# Patient Record
Sex: MALE | Race: WHITE | NOT HISPANIC OR LATINO | Employment: FULL TIME | URBAN - NONMETROPOLITAN AREA
[De-identification: names, ages, dates, MRNs, and addresses within clinical notes are randomized per-mention and may not be internally consistent; named-entity substitution may affect disease eponyms.]

---

## 2018-11-06 ENCOUNTER — HOSPITAL ENCOUNTER (EMERGENCY)
Facility: HOSPITAL | Age: 33
Discharge: HOME | End: 2018-11-06
Attending: FAMILY MEDICINE | Admitting: FAMILY MEDICINE

## 2018-11-06 VITALS
RESPIRATION RATE: 18 BRPM | BODY MASS INDEX: 24.21 KG/M2 | TEMPERATURE: 98.4 F | WEIGHT: 205 LBS | DIASTOLIC BLOOD PRESSURE: 75 MMHG | HEART RATE: 96 BPM | SYSTOLIC BLOOD PRESSURE: 133 MMHG | OXYGEN SATURATION: 97 % | HEIGHT: 77 IN

## 2018-11-06 DIAGNOSIS — L02.91 ABSCESS: Primary | ICD-10-CM

## 2018-11-06 LAB
ALBUMIN SERPL-MCNC: 3.9 G/DL (ref 3.4–4.8)
ALBUMIN/GLOB SERPL: 1.3 G/DL (ref 1.1–1.8)
ALP SERPL-CCNC: 132 U/L (ref 38–126)
ALT SERPL W P-5'-P-CCNC: 139 U/L (ref 21–72)
ANION GAP SERPL CALCULATED.3IONS-SCNC: 9 MMOL/L (ref 5–15)
AST SERPL-CCNC: 82 U/L (ref 17–59)
BASOPHILS # BLD AUTO: 0.02 10*3/MM3 (ref 0–0.2)
BASOPHILS NFR BLD AUTO: 0.2 % (ref 0–2)
BILIRUB SERPL-MCNC: 1.1 MG/DL (ref 0.2–1.3)
BUN BLD-MCNC: 14 MG/DL (ref 7–21)
BUN/CREAT SERPL: 16.3 (ref 7–25)
CALCIUM SPEC-SCNC: 8.7 MG/DL (ref 8.4–10.2)
CHLORIDE SERPL-SCNC: 102 MMOL/L (ref 95–110)
CO2 SERPL-SCNC: 25 MMOL/L (ref 22–31)
CREAT BLD-MCNC: 0.86 MG/DL (ref 0.7–1.3)
D-LACTATE SERPL-SCNC: 0.7 MMOL/L (ref 0.5–2)
DEPRECATED RDW RBC AUTO: 40.7 FL (ref 35.1–43.9)
EOSINOPHIL # BLD AUTO: 0.13 10*3/MM3 (ref 0–0.7)
EOSINOPHIL NFR BLD AUTO: 1.1 % (ref 0–7)
ERYTHROCYTE [DISTWIDTH] IN BLOOD BY AUTOMATED COUNT: 12.8 % (ref 11.5–14.5)
GFR SERPL CREATININE-BSD FRML MDRD: 102 ML/MIN/1.73 (ref 70–162)
GLOBULIN UR ELPH-MCNC: 2.9 GM/DL (ref 2.3–3.5)
GLUCOSE BLD-MCNC: 107 MG/DL (ref 60–100)
HCT VFR BLD AUTO: 36.9 % (ref 39–49)
HGB BLD-MCNC: 13.3 G/DL (ref 13.7–17.3)
HOLD SPECIMEN: NORMAL
IMM GRANULOCYTES # BLD: 0.03 10*3/MM3 (ref 0–0.02)
IMM GRANULOCYTES NFR BLD: 0.3 % (ref 0–0.5)
LYMPHOCYTES # BLD AUTO: 1.28 10*3/MM3 (ref 0.6–4.2)
LYMPHOCYTES NFR BLD AUTO: 10.9 % (ref 10–50)
MCH RBC QN AUTO: 31.1 PG (ref 26.5–34)
MCHC RBC AUTO-ENTMCNC: 36 G/DL (ref 31.5–36.3)
MCV RBC AUTO: 86.2 FL (ref 80–98)
MONOCYTES # BLD AUTO: 1.23 10*3/MM3 (ref 0–0.9)
MONOCYTES NFR BLD AUTO: 10.5 % (ref 0–12)
NEUTROPHILS # BLD AUTO: 9.07 10*3/MM3 (ref 2–8.6)
NEUTROPHILS NFR BLD AUTO: 77 % (ref 37–80)
PLATELET # BLD AUTO: 179 10*3/MM3 (ref 150–450)
PMV BLD AUTO: 10.9 FL (ref 8–12)
POTASSIUM BLD-SCNC: 3.3 MMOL/L (ref 3.5–5.1)
PROT SERPL-MCNC: 6.8 G/DL (ref 6.3–8.6)
RBC # BLD AUTO: 4.28 10*6/MM3 (ref 4.37–5.74)
SODIUM BLD-SCNC: 136 MMOL/L (ref 137–145)
WBC NRBC COR # BLD: 11.76 10*3/MM3 (ref 3.2–9.8)

## 2018-11-06 PROCEDURE — 96375 TX/PRO/DX INJ NEW DRUG ADDON: CPT

## 2018-11-06 PROCEDURE — 80074 ACUTE HEPATITIS PANEL: CPT | Performed by: FAMILY MEDICINE

## 2018-11-06 PROCEDURE — 25010000002 VANCOMYCIN: Performed by: FAMILY MEDICINE

## 2018-11-06 PROCEDURE — 96365 THER/PROPH/DIAG IV INF INIT: CPT

## 2018-11-06 PROCEDURE — 80053 COMPREHEN METABOLIC PANEL: CPT | Performed by: FAMILY MEDICINE

## 2018-11-06 PROCEDURE — 85025 COMPLETE CBC W/AUTO DIFF WBC: CPT | Performed by: FAMILY MEDICINE

## 2018-11-06 PROCEDURE — 25010000002 KETOROLAC TROMETHAMINE PER 15 MG

## 2018-11-06 PROCEDURE — 87205 SMEAR GRAM STAIN: CPT | Performed by: FAMILY MEDICINE

## 2018-11-06 PROCEDURE — 99283 EMERGENCY DEPT VISIT LOW MDM: CPT

## 2018-11-06 PROCEDURE — 83605 ASSAY OF LACTIC ACID: CPT | Performed by: FAMILY MEDICINE

## 2018-11-06 PROCEDURE — 87147 CULTURE TYPE IMMUNOLOGIC: CPT | Performed by: FAMILY MEDICINE

## 2018-11-06 PROCEDURE — 87077 CULTURE AEROBIC IDENTIFY: CPT | Performed by: FAMILY MEDICINE

## 2018-11-06 PROCEDURE — 87186 SC STD MICRODIL/AGAR DIL: CPT | Performed by: FAMILY MEDICINE

## 2018-11-06 PROCEDURE — 87070 CULTURE OTHR SPECIMN AEROBIC: CPT | Performed by: FAMILY MEDICINE

## 2018-11-06 PROCEDURE — 87040 BLOOD CULTURE FOR BACTERIA: CPT | Performed by: FAMILY MEDICINE

## 2018-11-06 RX ORDER — KETOROLAC TROMETHAMINE 30 MG/ML
30 INJECTION, SOLUTION INTRAMUSCULAR; INTRAVENOUS EVERY 6 HOURS PRN
Status: DISCONTINUED | OUTPATIENT
Start: 2018-11-06 | End: 2018-11-06 | Stop reason: HOSPADM

## 2018-11-06 RX ORDER — LIDOCAINE HYDROCHLORIDE 10 MG/ML
INJECTION, SOLUTION EPIDURAL; INFILTRATION; INTRACAUDAL; PERINEURAL
Status: COMPLETED
Start: 2018-11-06 | End: 2018-11-06

## 2018-11-06 RX ORDER — KETOROLAC TROMETHAMINE 30 MG/ML
INJECTION, SOLUTION INTRAMUSCULAR; INTRAVENOUS
Status: COMPLETED
Start: 2018-11-06 | End: 2018-11-06

## 2018-11-06 RX ORDER — LIDOCAINE HYDROCHLORIDE 10 MG/ML
10 INJECTION, SOLUTION EPIDURAL; INFILTRATION; INTRACAUDAL; PERINEURAL ONCE
Status: COMPLETED | OUTPATIENT
Start: 2018-11-06 | End: 2018-11-06

## 2018-11-06 RX ORDER — KETOROLAC TROMETHAMINE 30 MG/ML
60 INJECTION, SOLUTION INTRAMUSCULAR; INTRAVENOUS ONCE
Status: DISCONTINUED | OUTPATIENT
Start: 2018-11-06 | End: 2018-11-06

## 2018-11-06 RX ORDER — SULFAMETHOXAZOLE AND TRIMETHOPRIM 800; 160 MG/1; MG/1
1 TABLET ORAL 2 TIMES DAILY
Qty: 14 TABLET | Refills: 0 | Status: SHIPPED | OUTPATIENT
Start: 2018-11-06 | End: 2018-11-07

## 2018-11-06 RX ADMIN — KETOROLAC TROMETHAMINE 30 MG: 30 INJECTION, SOLUTION INTRAMUSCULAR at 02:42

## 2018-11-06 RX ADMIN — LIDOCAINE HYDROCHLORIDE 10 ML: 10 INJECTION, SOLUTION EPIDURAL; INFILTRATION; INTRACAUDAL; PERINEURAL at 03:55

## 2018-11-06 RX ADMIN — LIDOCAINE HYDROCHLORIDE 10 ML: 10 INJECTION, SOLUTION EPIDURAL; INFILTRATION; INTRACAUDAL at 03:55

## 2018-11-06 RX ADMIN — VANCOMYCIN HYDROCHLORIDE 1750 MG: 1 INJECTION, POWDER, LYOPHILIZED, FOR SOLUTION INTRAVENOUS at 04:34

## 2018-11-06 RX ADMIN — KETOROLAC TROMETHAMINE 30 MG: 30 INJECTION, SOLUTION INTRAMUSCULAR; INTRAVENOUS at 02:42

## 2018-11-06 NOTE — ED PROVIDER NOTES
Subjective   33 white male presents to emergency department with complaint of a skin lesion on his right forearm.  He states that he first noticed a small pimple on his arm approximate 4 days ago.  It got progressively worse and red.  He was seen in the urgent care this morning and given an injection of Rocephin and started on an oral antibiotic once a day, apparently, Levaquin.  He was told that if he got presently worse he was to return to care or go to the emergency room.  His pain currently as 6 out of 10.        Wound Infection   Severity:  Moderate  Onset quality:  Gradual  Duration:  4 days  Timing:  Constant  Progression:  Worsening  Chronicity:  New  Associated symptoms: fever    Associated symptoms: no abdominal pain, no chest pain, no cough, no diarrhea, no fatigue, no loss of consciousness, no myalgias, no nausea, no rash, no rhinorrhea, no shortness of breath, no sore throat, no vomiting and no wheezing        Review of Systems   Constitutional: Positive for fever. Negative for activity change, appetite change, chills, fatigue and unexpected weight change.   HENT: Negative for nosebleeds, rhinorrhea, sore throat, trouble swallowing and voice change.    Eyes: Negative for photophobia, pain and visual disturbance.   Respiratory: Negative for apnea, cough, chest tightness, shortness of breath, wheezing and stridor.    Cardiovascular: Negative for chest pain, palpitations and leg swelling.   Gastrointestinal: Negative for abdominal distention, abdominal pain, blood in stool, constipation, diarrhea, nausea and vomiting.   Endocrine: Negative for cold intolerance, heat intolerance, polydipsia and polyuria.   Genitourinary: Negative for decreased urine volume, difficulty urinating, dysuria, flank pain, hematuria and urgency.   Musculoskeletal: Negative for arthralgias, myalgias, neck pain and neck stiffness.   Skin: Negative for color change, pallor and rash.   Allergic/Immunologic: Negative for  immunocompromised state.   Neurological: Negative for dizziness, seizures, loss of consciousness, syncope, weakness, light-headedness and numbness.   Hematological: Negative for adenopathy.   Psychiatric/Behavioral: Negative for agitation, confusion, dysphoric mood and suicidal ideas. The patient is not nervous/anxious.        History reviewed. No pertinent past medical history.    No Known Allergies    History reviewed. No pertinent surgical history.    No family history on file.    Social History     Social History   • Marital status: Single     Social History Main Topics   • Drug use: Unknown     Other Topics Concern   • Not on file           Objective   Physical Exam   Constitutional: He is oriented to person, place, and time. He appears well-developed and well-nourished. No distress.   HENT:   Head: Normocephalic and atraumatic.   Right Ear: External ear normal.   Left Ear: External ear normal.   Mouth/Throat: Oropharynx is clear and moist.   Eyes: Pupils are equal, round, and reactive to light. Conjunctivae and EOM are normal. No scleral icterus.   Neck: Normal range of motion. Neck supple. No JVD present. No tracheal deviation present. No thyromegaly present.   Cardiovascular: Normal rate and regular rhythm.  Exam reveals no gallop and no friction rub.    No murmur heard.  Pulmonary/Chest: Effort normal and breath sounds normal. No stridor. No respiratory distress. He has no wheezes. He has no rales. He exhibits no tenderness.   Abdominal: Soft. Bowel sounds are normal. He exhibits no distension and no mass. There is no tenderness. There is no rebound and no guarding.   Musculoskeletal: He exhibits no edema, tenderness or deformity.   Lymphadenopathy:     He has no cervical adenopathy.   Neurological: He is alert and oriented to person, place, and time. No cranial nerve deficit. He exhibits normal muscle tone. Coordination normal.   Skin: Skin is warm and dry. He is not diaphoretic. There is erythema.         There is a large area of cellulitis on the posterior aspect of the right forearm.  There is edema and erythema on the medial aspect with lymphangitis tracking anteriorly up the bicep.   Psychiatric: He has a normal mood and affect.       Incision & Drainage  Date/Time: 11/6/2018 4:11 AM  Performed by: GABY ARMENDARIZ  Authorized by: GABY ARMENDARIZ     Consent:     Consent obtained:  Verbal    Consent given by:  Patient    Risks discussed:  Bleeding, incomplete drainage, infection and pain    Alternatives discussed:  No treatment, alternative treatment and observation  Location:     Type:  Abscess    Location:  Upper extremity    Upper extremity location:  Elbow    Elbow location:  R elbow  Pre-procedure details:     Skin preparation:  Chloraprep  Anesthesia (see MAR for exact dosages):     Anesthesia method:  Local infiltration    Local anesthetic:  Lidocaine 1% w/o epi  Procedure type:     Complexity:  Complex  Procedure details:     Needle aspiration: no      Incision types:  Single straight    Incision depth:  Subcutaneous    Scalpel blade:  11    Wound management:  Probed and deloculated    Drainage:  Purulent    Drainage amount:  Moderate    Wound treatment:  Wound left open    Packing materials:  1/4 in iodoform gauze  Post-procedure details:     Patient tolerance of procedure:  Tolerated well, no immediate complications               ED Course  ED Course as of Nov 06 0616   Tue Nov 06, 2018   0412 Patient was placed in room 10 and evaluated by me.  He was febrile with a temperature of 100.9.  His white count was 12,000.  Lactate was 0.7.  Blood cultures were obtained.  I&D was performed and expressed a moderate amount of purulent material.  The arm looked quite a bit better after the procedure.  The wound was cleaned and dressed by the nurse, Anastacia.  IV vancomycin was given in the emergency room.  At this point obviously he is stable for discharge and will follow up tomorrow for wound  repacking.  He will be discharged on an oral antibiotic.  [CE]      ED Course User Index  [CE] Rajiv Gooden DO        Labs Reviewed   COMPREHENSIVE METABOLIC PANEL - Abnormal; Notable for the following:        Result Value    Glucose 107 (*)     Sodium 136 (*)     Potassium 3.3 (*)     ALT (SGPT) 139 (*)     AST (SGOT) 82 (*)     Alkaline Phosphatase 132 (*)     All other components within normal limits   CBC WITH AUTO DIFFERENTIAL - Abnormal; Notable for the following:     WBC 11.76 (*)     RBC 4.28 (*)     Hemoglobin 13.3 (*)     Hematocrit 36.9 (*)     Neutrophils, Absolute 9.07 (*)     Monocytes, Absolute 1.23 (*)     Immature Grans, Absolute 0.03 (*)     All other components within normal limits   LACTIC ACID, PLASMA - Normal   WOUND CULTURE   BLOOD CULTURE   BLOOD CULTURE   CBC AND DIFFERENTIAL    Narrative:     The following orders were created for panel order CBC & Differential.  Procedure                               Abnormality         Status                     ---------                               -----------         ------                     CBC Auto Differential[[id number]]        Abnormal            Final result                 Please view results for these tests on the individual orders.   EXTRA TUBES    Narrative:     The following orders were created for panel order Extra Tubes.  Procedure                               Abnormality         Status                     ---------                               -----------         ------                     Gold Top - SST[[id number]]                                   Final result                 Please view results for these tests on the individual orders.   GOLD TOP - SST     No results found.          [facility]      Final diagnoses:   Abscess            Rajiv Gooden DO  11/06/18 0617

## 2018-11-07 ENCOUNTER — APPOINTMENT (OUTPATIENT)
Dept: GENERAL RADIOLOGY | Facility: HOSPITAL | Age: 33
End: 2018-11-07

## 2018-11-07 ENCOUNTER — HOSPITAL ENCOUNTER (INPATIENT)
Facility: HOSPITAL | Age: 33
LOS: 3 days | Discharge: HOME | End: 2018-11-10
Attending: EMERGENCY MEDICINE | Admitting: FAMILY MEDICINE

## 2018-11-07 ENCOUNTER — APPOINTMENT (OUTPATIENT)
Dept: INTERVENTIONAL RADIOLOGY/VASCULAR | Facility: HOSPITAL | Age: 33
End: 2018-11-07

## 2018-11-07 ENCOUNTER — APPOINTMENT (OUTPATIENT)
Dept: ULTRASOUND IMAGING | Facility: HOSPITAL | Age: 33
End: 2018-11-07

## 2018-11-07 ENCOUNTER — APPOINTMENT (OUTPATIENT)
Dept: CT IMAGING | Facility: HOSPITAL | Age: 33
End: 2018-11-07

## 2018-11-07 DIAGNOSIS — L03.113 CELLULITIS OF RIGHT UPPER EXTREMITY: Primary | ICD-10-CM

## 2018-11-07 LAB
ALBUMIN SERPL-MCNC: 3.7 G/DL (ref 3.4–4.8)
ALBUMIN/GLOB SERPL: 1.2 G/DL (ref 1.1–1.8)
ALP SERPL-CCNC: 147 U/L (ref 38–126)
ALT SERPL W P-5'-P-CCNC: 130 U/L (ref 21–72)
AMPHET+METHAMPHET UR QL: NEGATIVE
ANION GAP SERPL CALCULATED.3IONS-SCNC: 6 MMOL/L (ref 5–15)
AST SERPL-CCNC: 61 U/L (ref 17–59)
BACTERIA UR QL AUTO: ABNORMAL /HPF
BARBITURATES UR QL SCN: NEGATIVE
BASOPHILS # BLD AUTO: 0.03 10*3/MM3 (ref 0–0.2)
BASOPHILS NFR BLD AUTO: 0.3 % (ref 0–2)
BENZODIAZ UR QL SCN: NEGATIVE
BILIRUB SERPL-MCNC: 1 MG/DL (ref 0.2–1.3)
BILIRUB UR QL STRIP: ABNORMAL
BUN BLD-MCNC: 12 MG/DL (ref 7–21)
BUN/CREAT SERPL: 12.5 (ref 7–25)
CALCIUM SPEC-SCNC: 8.8 MG/DL (ref 8.4–10.2)
CANNABINOIDS SERPL QL: NEGATIVE
CHLORIDE SERPL-SCNC: 102 MMOL/L (ref 95–110)
CLARITY UR: CLEAR
CO2 SERPL-SCNC: 27 MMOL/L (ref 22–31)
COCAINE UR QL: NEGATIVE
COLOR UR: ABNORMAL
CREAT BLD-MCNC: 0.96 MG/DL (ref 0.7–1.3)
CRP SERPL-MCNC: 29.3 MG/DL (ref 0–1)
DEPRECATED RDW RBC AUTO: 40.5 FL (ref 35.1–43.9)
EOSINOPHIL # BLD AUTO: 0.27 10*3/MM3 (ref 0–0.7)
EOSINOPHIL NFR BLD AUTO: 2.7 % (ref 0–7)
ERYTHROCYTE [DISTWIDTH] IN BLOOD BY AUTOMATED COUNT: 12.6 % (ref 11.5–14.5)
ERYTHROCYTE [SEDIMENTATION RATE] IN BLOOD: 50 MM/HR (ref 0–15)
GFR SERPL CREATININE-BSD FRML MDRD: 90 ML/MIN/1.73 (ref 70–162)
GLOBULIN UR ELPH-MCNC: 3.2 GM/DL (ref 2.3–3.5)
GLUCOSE BLD-MCNC: 95 MG/DL (ref 60–100)
GLUCOSE UR STRIP-MCNC: NEGATIVE MG/DL
HCT VFR BLD AUTO: 38.5 % (ref 39–49)
HGB BLD-MCNC: 13.7 G/DL (ref 13.7–17.3)
HGB UR QL STRIP.AUTO: NEGATIVE
HOLD SPECIMEN: NORMAL
HYALINE CASTS UR QL AUTO: ABNORMAL /LPF
IMM GRANULOCYTES # BLD: 0.04 10*3/MM3 (ref 0–0.02)
IMM GRANULOCYTES NFR BLD: 0.4 % (ref 0–0.5)
KETONES UR QL STRIP: ABNORMAL
LEUKOCYTE ESTERASE UR QL STRIP.AUTO: ABNORMAL
LYMPHOCYTES # BLD AUTO: 1.38 10*3/MM3 (ref 0.6–4.2)
LYMPHOCYTES NFR BLD AUTO: 13.9 % (ref 10–50)
MCH RBC QN AUTO: 30.8 PG (ref 26.5–34)
MCHC RBC AUTO-ENTMCNC: 35.6 G/DL (ref 31.5–36.3)
MCV RBC AUTO: 86.5 FL (ref 80–98)
METHADONE UR QL SCN: NEGATIVE
MONOCYTES # BLD AUTO: 1.09 10*3/MM3 (ref 0–0.9)
MONOCYTES NFR BLD AUTO: 11 % (ref 0–12)
MUCOUS THREADS URNS QL MICRO: ABNORMAL /HPF
NEUTROPHILS # BLD AUTO: 7.09 10*3/MM3 (ref 2–8.6)
NEUTROPHILS NFR BLD AUTO: 71.7 % (ref 37–80)
NITRITE UR QL STRIP: NEGATIVE
OPIATES UR QL: POSITIVE
OXYCODONE UR QL SCN: NEGATIVE
PH UR STRIP.AUTO: 7.5 [PH] (ref 5–9)
PLATELET # BLD AUTO: 180 10*3/MM3 (ref 150–450)
PMV BLD AUTO: 10.8 FL (ref 8–12)
POTASSIUM BLD-SCNC: 3.6 MMOL/L (ref 3.5–5.1)
PROT SERPL-MCNC: 6.9 G/DL (ref 6.3–8.6)
PROT UR QL STRIP: ABNORMAL
RBC # BLD AUTO: 4.45 10*6/MM3 (ref 4.37–5.74)
RBC # UR: ABNORMAL /HPF
REF LAB TEST METHOD: ABNORMAL
SODIUM BLD-SCNC: 135 MMOL/L (ref 137–145)
SP GR UR STRIP: 1.03 (ref 1–1.03)
SQUAMOUS #/AREA URNS HPF: ABNORMAL /HPF
UROBILINOGEN UR QL STRIP: ABNORMAL
WBC NRBC COR # BLD: 9.9 10*3/MM3 (ref 3.2–9.8)
WBC UR QL AUTO: ABNORMAL /HPF
WHOLE BLOOD HOLD SPECIMEN: NORMAL

## 2018-11-07 PROCEDURE — 25010000002 PIPERACILLIN SOD-TAZOBACTAM PER 1 G: Performed by: EMERGENCY MEDICINE

## 2018-11-07 PROCEDURE — C1751 CATH, INF, PER/CENT/MIDLINE: HCPCS

## 2018-11-07 PROCEDURE — 76937 US GUIDE VASCULAR ACCESS: CPT

## 2018-11-07 PROCEDURE — 87077 CULTURE AEROBIC IDENTIFY: CPT | Performed by: PHYSICIAN ASSISTANT

## 2018-11-07 PROCEDURE — 25010000002 IOPAMIDOL 61 % SOLUTION: Performed by: ORTHOPAEDIC SURGERY

## 2018-11-07 PROCEDURE — 73090 X-RAY EXAM OF FOREARM: CPT

## 2018-11-07 PROCEDURE — 81001 URINALYSIS AUTO W/SCOPE: CPT | Performed by: EMERGENCY MEDICINE

## 2018-11-07 PROCEDURE — 02HV33Z INSERTION OF INFUSION DEVICE INTO SUPERIOR VENA CAVA, PERCUTANEOUS APPROACH: ICD-10-PCS | Performed by: FAMILY MEDICINE

## 2018-11-07 PROCEDURE — 80307 DRUG TEST PRSMV CHEM ANLYZR: CPT | Performed by: EMERGENCY MEDICINE

## 2018-11-07 PROCEDURE — 85651 RBC SED RATE NONAUTOMATED: CPT | Performed by: EMERGENCY MEDICINE

## 2018-11-07 PROCEDURE — B548ZZA ULTRASONOGRAPHY OF SUPERIOR VENA CAVA, GUIDANCE: ICD-10-PCS | Performed by: FAMILY MEDICINE

## 2018-11-07 PROCEDURE — 99232 SBSQ HOSP IP/OBS MODERATE 35: CPT | Performed by: ORTHOPAEDIC SURGERY

## 2018-11-07 PROCEDURE — 86140 C-REACTIVE PROTEIN: CPT | Performed by: EMERGENCY MEDICINE

## 2018-11-07 PROCEDURE — 87070 CULTURE OTHR SPECIMN AEROBIC: CPT | Performed by: PHYSICIAN ASSISTANT

## 2018-11-07 PROCEDURE — 87205 SMEAR GRAM STAIN: CPT | Performed by: PHYSICIAN ASSISTANT

## 2018-11-07 PROCEDURE — 73060 X-RAY EXAM OF HUMERUS: CPT

## 2018-11-07 PROCEDURE — 85025 COMPLETE CBC W/AUTO DIFF WBC: CPT | Performed by: EMERGENCY MEDICINE

## 2018-11-07 PROCEDURE — 93971 EXTREMITY STUDY: CPT

## 2018-11-07 PROCEDURE — 73201 CT UPPER EXTREMITY W/DYE: CPT

## 2018-11-07 PROCEDURE — 25010000002 CEFEPIME PER 500 MG: Performed by: FAMILY MEDICINE

## 2018-11-07 PROCEDURE — 25010000002 VANCOMYCIN 5 G RECONSTITUTED SOLUTION: Performed by: EMERGENCY MEDICINE

## 2018-11-07 PROCEDURE — 80053 COMPREHEN METABOLIC PANEL: CPT | Performed by: EMERGENCY MEDICINE

## 2018-11-07 PROCEDURE — 87147 CULTURE TYPE IMMUNOLOGIC: CPT | Performed by: PHYSICIAN ASSISTANT

## 2018-11-07 PROCEDURE — 99284 EMERGENCY DEPT VISIT MOD MDM: CPT

## 2018-11-07 RX ORDER — VENLAFAXINE 75 MG/1
75 TABLET ORAL DAILY
COMMUNITY
End: 2019-05-23

## 2018-11-07 RX ORDER — MORPHINE SULFATE 2 MG/ML
2 INJECTION, SOLUTION INTRAMUSCULAR; INTRAVENOUS
Status: DISCONTINUED | OUTPATIENT
Start: 2018-11-07 | End: 2018-11-10 | Stop reason: HOSPADM

## 2018-11-07 RX ORDER — ACETAMINOPHEN 325 MG/1
650 TABLET ORAL EVERY 4 HOURS PRN
Status: DISCONTINUED | OUTPATIENT
Start: 2018-11-07 | End: 2018-11-10 | Stop reason: HOSPADM

## 2018-11-07 RX ORDER — SODIUM CHLORIDE 0.9 % (FLUSH) 0.9 %
3-10 SYRINGE (ML) INJECTION AS NEEDED
Status: DISCONTINUED | OUTPATIENT
Start: 2018-11-07 | End: 2018-11-10 | Stop reason: HOSPADM

## 2018-11-07 RX ORDER — HYDROCODONE BITARTRATE AND ACETAMINOPHEN 10; 325 MG/1; MG/1
1 TABLET ORAL EVERY 6 HOURS PRN
COMMUNITY

## 2018-11-07 RX ORDER — IPRATROPIUM BROMIDE AND ALBUTEROL SULFATE 2.5; .5 MG/3ML; MG/3ML
3 SOLUTION RESPIRATORY (INHALATION) EVERY 4 HOURS PRN
Status: DISCONTINUED | OUTPATIENT
Start: 2018-11-07 | End: 2018-11-10 | Stop reason: HOSPADM

## 2018-11-07 RX ORDER — KETOROLAC TROMETHAMINE 30 MG/ML
30 INJECTION, SOLUTION INTRAMUSCULAR; INTRAVENOUS EVERY 6 HOURS PRN
Status: DISCONTINUED | OUTPATIENT
Start: 2018-11-07 | End: 2018-11-10 | Stop reason: HOSPADM

## 2018-11-07 RX ORDER — SODIUM CHLORIDE 0.9 % (FLUSH) 0.9 %
10 SYRINGE (ML) INJECTION AS NEEDED
Status: DISCONTINUED | OUTPATIENT
Start: 2018-11-07 | End: 2018-11-10 | Stop reason: HOSPADM

## 2018-11-07 RX ORDER — SODIUM CHLORIDE 0.9 % (FLUSH) 0.9 %
3 SYRINGE (ML) INJECTION EVERY 12 HOURS SCHEDULED
Status: DISCONTINUED | OUTPATIENT
Start: 2018-11-07 | End: 2018-11-10 | Stop reason: HOSPADM

## 2018-11-07 RX ORDER — HYDROCODONE BITARTRATE AND ACETAMINOPHEN 10; 325 MG/1; MG/1
1 TABLET ORAL EVERY 6 HOURS PRN
Status: DISCONTINUED | OUTPATIENT
Start: 2018-11-07 | End: 2018-11-10 | Stop reason: HOSPADM

## 2018-11-07 RX ORDER — ONDANSETRON 2 MG/ML
4 INJECTION INTRAMUSCULAR; INTRAVENOUS EVERY 6 HOURS PRN
Status: DISCONTINUED | OUTPATIENT
Start: 2018-11-07 | End: 2018-11-07

## 2018-11-07 RX ORDER — ONDANSETRON 2 MG/ML
4 INJECTION INTRAMUSCULAR; INTRAVENOUS EVERY 6 HOURS PRN
Status: DISCONTINUED | OUTPATIENT
Start: 2018-11-07 | End: 2018-11-10 | Stop reason: HOSPADM

## 2018-11-07 RX ADMIN — Medication 10 ML: at 16:09

## 2018-11-07 RX ADMIN — CEFEPIME HYDROCHLORIDE 1 G: 1 INJECTION, POWDER, FOR SOLUTION INTRAMUSCULAR; INTRAVENOUS at 20:25

## 2018-11-07 RX ADMIN — VANCOMYCIN HYDROCHLORIDE 1750 MG: 5 INJECTION, POWDER, LYOPHILIZED, FOR SOLUTION INTRAVENOUS at 16:09

## 2018-11-07 RX ADMIN — Medication 3 ML: at 20:25

## 2018-11-07 RX ADMIN — IOPAMIDOL 93 ML: 612 INJECTION, SOLUTION INTRAVENOUS at 15:53

## 2018-11-07 RX ADMIN — MAGNESIUM SULFATE 20 G: 1 CRYSTAL ORAL; TOPICAL at 21:50

## 2018-11-07 RX ADMIN — PIPERACILLIN SODIUM,TAZOBACTAM SODIUM 3.38 G: 3; .375 INJECTION, POWDER, FOR SOLUTION INTRAVENOUS at 13:52

## 2018-11-07 NOTE — PLAN OF CARE
Problem: Skin and Soft Tissue Infection (Adult)  Goal: Signs and Symptoms of Listed Potential Problems Will be Absent, Minimized or Managed (Skin and Soft Tissue Infection)  Outcome: Ongoing (interventions implemented as appropriate)      Problem: Pain, Acute (Adult)  Goal: Identify Related Risk Factors and Signs and Symptoms  Outcome: Outcome(s) achieved Date Met: 11/07/18    Goal: Acceptable Pain Control/Comfort Level  Outcome: Ongoing (interventions implemented as appropriate)

## 2018-11-07 NOTE — ED PROVIDER NOTES
Subjective   Patient presents with a five-day history of right upper extremity infection.  Patient noted that this started with a pimple on his forearm.  This pimple became more red and inflamed to an area about palm size.  Patient was seen 2 nights ago in the ED and was decided to incise and drain the area.  There is a moderate amount of purulent material that was expressed from the area.  Patient was given a dose of vancomycin started on Levaquin at home.  Patient has done worse in last 24-48 hours.  Patient is more soft tissue swelling in the forearm as well as tracking up the upper arm involving most the medial portion of the upper arm to the axilla and some on the lateral portion as well.  There is induration in this region or some mottling of the skin.  The actual settings incision and drainage is draining purulent material.            Review of Systems   Constitutional: Positive for fatigue. Negative for appetite change, chills and fever.   HENT: Negative.  Negative for congestion.    Eyes: Negative.  Negative for photophobia and visual disturbance.   Respiratory: Negative.  Negative for cough, chest tightness and shortness of breath.    Cardiovascular: Negative.  Negative for chest pain and palpitations.   Gastrointestinal: Negative.  Negative for abdominal pain, constipation, diarrhea, nausea and vomiting.   Endocrine: Negative.    Genitourinary: Negative.  Negative for decreased urine volume, dysuria, flank pain and hematuria.   Musculoskeletal: Negative.  Negative for arthralgias, back pain, myalgias, neck pain and neck stiffness.   Skin: Negative.  Negative for pallor.   Neurological: Negative.  Negative for dizziness, syncope, weakness, light-headedness, numbness and headaches.   Psychiatric/Behavioral: Negative.  Negative for confusion and suicidal ideas. The patient is not nervous/anxious.    All other systems reviewed and are negative.      History reviewed. No pertinent past medical history.    No  Known Allergies    History reviewed. No pertinent surgical history.    History reviewed. No pertinent family history.    Social History     Social History   • Marital status: Single     Social History Main Topics   • Drug use: Unknown     Other Topics Concern   • Not on file           Objective   Physical Exam   Constitutional: He is oriented to person, place, and time. He appears well-developed and well-nourished. No distress.   HENT:   Head: Normocephalic and atraumatic.   Eyes: Conjunctivae and EOM are normal.   Neck: Normal range of motion. Neck supple. No JVD present.   Cardiovascular: Normal rate, regular rhythm, normal heart sounds and intact distal pulses.  Exam reveals no gallop and no friction rub.    No murmur heard.  Pulmonary/Chest: Effort normal. No respiratory distress. He has no wheezes. He has no rales. He exhibits no tenderness.   Abdominal: Soft. Bowel sounds are normal. He exhibits no distension and no mass. There is no tenderness. There is no rebound and no guarding.   Musculoskeletal: He exhibits edema and tenderness.   Patient with a large area of edema involving the upper extremity from the axilla down through the hand.  There is erythema from the area of the mid forearm up to the axilla.  There is 2+ distal pulses this area there is a range of motion the fingers though this is somewhat tight for the patient with the edema.   Neurological: He is alert and oriented to person, place, and time.   Skin: Skin is warm and dry.   Psychiatric: He has a normal mood and affect. His behavior is normal. Judgment and thought content normal.   Nursing note and vitals reviewed.      Procedures           ED Course    Labs Reviewed   COMPREHENSIVE METABOLIC PANEL - Abnormal; Notable for the following:        Result Value    Sodium 135 (*)     ALT (SGPT) 130 (*)     AST (SGOT) 61 (*)     Alkaline Phosphatase 147 (*)     All other components within normal limits   URINALYSIS W/ MICROSCOPIC IF INDICATED (NO  CULTURE) - Abnormal; Notable for the following:     Ketones, UA 15 mg/dL (1+) (*)     Bilirubin, UA Moderate (2+) (*)     Protein, UA 30 mg/dL (1+) (*)     Leuk Esterase, UA Trace (*)     Urobilinogen, UA 4.0 E.U./dL (*)     All other components within normal limits   C-REACTIVE PROTEIN - Abnormal; Notable for the following:     C-Reactive Protein 29.30 (*)     All other components within normal limits   CBC WITH AUTO DIFFERENTIAL - Abnormal; Notable for the following:     WBC 9.90 (*)     Hematocrit 38.5 (*)     Monocytes, Absolute 1.09 (*)     Immature Grans, Absolute 0.04 (*)     All other components within normal limits   BLOOD CULTURE   BLOOD CULTURE   SEDIMENTATION RATE   URINALYSIS, MICROSCOPIC ONLY   CBC AND DIFFERENTIAL    Narrative:     The following orders were created for panel order CBC & Differential.  Procedure                               Abnormality         Status                     ---------                               -----------         ------                     CBC Auto Differential[[id number]]        Abnormal            Final result                 Please view results for these tests on the individual orders.   EXTRA TUBES    Narrative:     The following orders were created for panel order Extra Tubes.  Procedure                               Abnormality         Status                     ---------                               -----------         ------                     Light Blue Top[[id number]]                                   In process                 Gold Top - SST[[id number]]                                   In process                   Please view results for these tests on the individual orders.   LIGHT BLUE TOP   GOLD TOP - SST       XR Forearm 2 View Right    (Results Pending)   XR Humerus Right    (Results Pending)           Patient with large area cellulitis worsening with home oral antibiotics.  We'll admit the patient for IV antibiotics and further management.             MDM      Final diagnoses:   Cellulitis of right upper extremity            Tung Jones MD  11/07/18 [id number]

## 2018-11-07 NOTE — PROGRESS NOTES
TWO PATIENT IDENTIFIERS WERE USED. CONSENT WAS SIGNED PER PATIENT EDUCATION MATERIAL WAS GIVEN TO PATIENT AND / OR FAMILY. THE PATIENT WAS DRAPED WITH FULL BODY DRAPE AND PATIENT'S LEFT ARM WAS PREPPED WITH CHLORAPREP.  ULTRASOUND WAS USED TO LOCALIZE THELEFT BASILIC  VEIN. SUBCUTANEOUS TISSUE AT THE CATHETER SITE WAS INFILTRATED WITH 2% LIDOCAINE. UNDER ULTRASOUND GUIDANCE, THE VEIN WAS ACCESSED WITH A 21GAUGE  NEEDLE. AN 0.018 WIRE WAS THEN THREADED THROUGH THE NEEDLE INTO THE CENTRAL VENOUS SYSTEM. THE 21GAUGE  NEEDLE WAS REMOVED AND A 5 Nepali PEEL AWAY SHEATH WAS PLACED OVER THE WIRE. THE PICC LINE CATHETER WAS CUT AT 44 CM. THE PICC LINE CATHETER WAS THEN PLACED OVER THE WIRE INTO THE VEIN, THE SHEATH WAS PEELED AWAY,WIRE WAS REMOVED. CATHETER WAS FLUSHED WITH NORMAL SALINE AND TIPS APPLIED. BIOPATCH PLACED. CATHETER SECURED WITH STATLOCK AND TEGADERM. PATIENT TOLERATED PROCEDURE WELL. THIS WAS DONE IN THE   IN THE ULTRASOUND SUITE      IMPRESSION: SUCCESSFUL PLACEMENT OF TRIPLE LUMEN SOLO PICC        Emmy Mar RN  11/7/2018  3:41 PM

## 2018-11-07 NOTE — ED TRIAGE NOTES
Pt in ED yesterday morning around 0300 and had an I&D to right arm. Pt has increased redness and swelling to extremity, from hand to axilla.

## 2018-11-07 NOTE — H&P
[facility] Medicine Admission      Date of Admission: 11/7/2018      Primary Care Physician: Michelle Chambers MD      Chief Complaint: Infection of right upper extremity    HPI: The patient presents one day post-drainage of an abscess of the right upper extremity. He has a five day history of this infection. He first noted that this started with what he thought was a pimple, but over the last five days it has become larger and more inflamed. He presented to the ER on 11-6-18 where an I&D was performed, the wound was packed, and he was given Bactrim. Since that time his arm has become more swollen and cellulitic. 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    Concurrent Medical History:  Denies past medical history.    Past Surgical History:  Denies past surgical history.    Family History:  Diabetes mellitus, HTN    Social History:  Smokes 1/2 PPD; denies drug use and alcohol abuse.    Allergies: No Known Allergies    Medications:     Prior to Admission medications    Medication Sig Start Date End Date Taking? Authorizing Provider   sulfamethoxazole-trimethoprim (BACTRIM DS,SEPTRA DS) 800-160 MG per tablet Take 1 tablet by mouth 2 (Two) Times a Day for 7 days. 11/6/18 11/7/18 Yes Rajiv Gooden,      Review of Systems:  Review of Systems   Constitutional: Positive for chills, fatigue and fever.        Subjective fever, no measured fever   HENT: Negative for trouble swallowing.    Eyes: Negative for photophobia and visual disturbance.   Respiratory: Negative for chest tightness, shortness of breath, wheezing and stridor.    Cardiovascular: Negative for chest pain.   Gastrointestinal: Positive for nausea. Negative for abdominal pain, diarrhea and vomiting.   Endocrine: Negative.    Genitourinary: Negative.    Musculoskeletal: Positive for arthralgias and myalgias.   Skin: Positive for color change and wound.        Cellulitis    Neurological: Negative  for dizziness, seizures, light-headedness and numbness.   Psychiatric/Behavioral: Negative for agitation.      Otherwise complete ROS is negative except as mentioned above.    Physical Exam:   Temp:  [99.2 °F (37.3 °C)] 99.2 °F (37.3 °C)  Heart Rate:  [75-84] 75  Resp:  [20-24] 20  BP: (106-115)/(64-75) 106/64  Physical Exam   Constitutional: He is oriented to person, place, and time. He appears well-developed and well-nourished.   HENT:   Head: Normocephalic and atraumatic.   Eyes: Pupils are equal, round, and reactive to light. Conjunctivae are normal.   Neck: Normal range of motion. Neck supple.   Cardiovascular: Normal rate and regular rhythm.    Pulmonary/Chest: Effort normal and breath sounds normal.   Abdominal: Soft. Bowel sounds are normal. He exhibits no distension. There is no tenderness.   Musculoskeletal: He exhibits edema and tenderness.   Diffuse edema of the right elbow joint extending approximately 3-4 inches distal to the elbow and 2-3 inches proximal to the elbow   Neurological: He is alert and oriented to person, place, and time.   Skin: Skin is warm and dry. There is erythema.   Erythematous, indurated, no fluctuance, packing in place.  Open wound from previous incision and drainage   Psychiatric: He has a normal mood and affect. His behavior is normal.       Results Reviewed:  I have personally reviewed current lab, radiology, and data and agree with results.  Lab Results (last 24 hours)     Procedure Component Value Units Date/Time    Urine Drug Screen - Urine, Clean Catch [[id number]]  (Abnormal) Collected:  11/07/18 1231    Specimen:  Urine from Urine, Clean Catch Updated:  11/07/18 1425     Amphetamine Screen, Urine Negative     Barbiturates Screen, Urine Negative     Benzodiazepine Screen, Urine Negative     Cocaine Screen, Urine Negative     Methadone Screen, Urine Negative     Opiate Screen Positive (A)     Oxycodone Screen, Urine Negative     THC, Screen, Urine Negative    Narrative:        Negative Thresholds For Drugs Screened in Urine:     Amphetamines          500 ng/ml  Barbiturates          200 ng/ml  Benzodiazepines       200 ng/ml  Cocaine               150 ng/ml  Methadone             300 ng/mL  Opiates               300 ng/mL  Oxycodone             100 ng/mL  THC                   20 ng/mL    The normal value for all drugs tested is negative. This report includes final unconfirmed screening results.  A positive result by this assay can be, at your request, sent to the Reference Lab for confirmation by gas chromatography. Unconfirmed results must not be used for non-medical purposes, such as employment or legal testing. Clinical consideration should be applied to any drug of abuse test result, particularly when unconfirmed results are used.    Sedimentation Rate [[id number]]  (Abnormal) Collected:  11/07/18 1223    Specimen:  Blood Updated:  11/07/18 1335     Sed Rate 50 (H) mm/hr     Urinalysis, Microscopic Only - Urine, Clean Catch [[id number]]  (Abnormal) Collected:  11/07/18 1231    Specimen:  Urine from Urine, Clean Catch Updated:  11/07/18 1331     RBC, UA None Seen /HPF      WBC, UA 3-5 /HPF      Bacteria, UA Trace (A) /HPF      Squamous Epithelial Cells, UA None Seen /HPF      Hyaline Casts, UA None Seen /LPF      Mucus, UA Trace /HPF      Methodology Manual Light Microscopy    Extra Tubes [[id number]] Collected:  11/07/18 1228    Specimen:  Blood from Blood, Venous Line Updated:  11/07/18 1330    Narrative:       The following orders were created for panel order Extra Tubes.  Procedure                               Abnormality         Status                     ---------                               -----------         ------                     Light Blue Top[[id number]]                                   Final result               Gold Top - [facility][[id number]]                                   Final result                 Please view results for these tests on the individual orders.     Light Blue Top [[id number]] Collected:  11/07/18 1228    Specimen:  Blood Updated:  11/07/18 1330     Extra Tube hold for add-on     Comment: Auto resulted       Gold Top - [facility] [[id number]] Collected:  11/07/18 1228    Specimen:  Blood Updated:  11/07/18 1330     Extra Tube Hold for add-ons.     Comment: Auto resulted.       C-reactive Protein [[id number]]  (Abnormal) Collected:  11/07/18 1223    Specimen:  Blood Updated:  11/07/18 1259     C-Reactive Protein 29.30 (H) mg/dL     Urinalysis With Microscopic If Indicated (No Culture) - Urine, Clean Catch [[id number]]  (Abnormal) Collected:  11/07/18 1231    Specimen:  Urine from Urine, Clean Catch Updated:  11/07/18 1253     Color, UA Dark Yellow     Appearance, UA Clear     pH, UA 7.5     Specific Gravity, UA 1.030     Glucose, UA Negative     Ketones, UA 15 mg/dL (1+) (A)     Bilirubin, UA Moderate (2+) (A)     Blood, UA Negative     Protein, UA 30 mg/dL (1+) (A)     Leuk Esterase, UA Trace (A)     Nitrite, UA Negative     Urobilinogen, UA 4.0 E.U./dL (A)    Comprehensive Metabolic Panel [[id number]]  (Abnormal) Collected:  11/07/18 1223    Specimen:  Blood Updated:  11/07/18 1248     Glucose 95 mg/dL      BUN 12 mg/dL      Creatinine 0.96 mg/dL      Sodium 135 (L) mmol/L      Potassium 3.6 mmol/L      Chloride 102 mmol/L      CO2 27.0 mmol/L      Calcium 8.8 mg/dL      Total Protein 6.9 g/dL      Albumin 3.70 g/dL      ALT (SGPT) 130 (H) U/L      AST (SGOT) 61 (H) U/L      Alkaline Phosphatase 147 (H) U/L      Total Bilirubin 1.0 mg/dL      eGFR Non African Amer 90 mL/min/1.73      Globulin 3.2 gm/dL      A/G Ratio 1.2 g/dL      BUN/Creatinine Ratio 12.5     Anion Gap 6.0 mmol/L     CBC & Differential [[id number]] Collected:  11/07/18 1223    Specimen:  Blood Updated:  11/07/18 1235    Narrative:       The following orders were created for panel order CBC & Differential.  Procedure                               Abnormality         Status                     ---------                                -----------         ------                     CBC Auto Differential[[id number]]        Abnormal            Final result                 Please view results for these tests on the individual orders.    CBC Auto Differential [[id number]]  (Abnormal) Collected:  11/07/18 1223    Specimen:  Blood Updated:  11/07/18 1235     WBC 9.90 (H) 10*3/mm3      RBC 4.45 10*6/mm3      Hemoglobin 13.7 g/dL      Hematocrit 38.5 (L) %      MCV 86.5 fL      MCH 30.8 pg      MCHC 35.6 g/dL      RDW 12.6 %      RDW-SD 40.5 fl      MPV 10.8 fL      Platelets 180 10*3/mm3      Neutrophil % 71.7 %      Lymphocyte % 13.9 %      Monocyte % 11.0 %      Eosinophil % 2.7 %      Basophil % 0.3 %      Immature Grans % 0.4 %      Neutrophils, Absolute 7.09 10*3/mm3      Lymphocytes, Absolute 1.38 10*3/mm3      Monocytes, Absolute 1.09 (H) 10*3/mm3      Eosinophils, Absolute 0.27 10*3/mm3      Basophils, Absolute 0.03 10*3/mm3      Immature Grans, Absolute 0.04 (H) 10*3/mm3         Imaging Results (last 24 hours)     Procedure Component Value Units Date/Time    XR Humerus Right [[id number]] Collected:  11/07/18 1301     Updated:  11/07/18 1330    Narrative:         EXAM:  Radiograph(s), Osseous         REGION:   Humerus    SIDE:     Right     VIEWS:   2             INDICATION:    cellulitis     COMPARISON:    none              FINDINGS:           No evidence of a fracture or bony malalignment.     No evidence of osteolytic/osteoblastic disease.       .        Impression:       CONCLUSION:           1. No radiographic evidence of acute osseous pathology.              Note:  if pain or symptoms persist beyond reasonable expectations  and follow-up imaging is anticipated,  cross sectional imaging  (CT and/or MRI) is suggested, as is deemed clinically  appropriate.                      Electronically signed by:  YADI Rondon MD  11/7/2018 1:29  PM [facility] Workstation: [id number]    XR Forearm 2 View Right [[id number]] Collected:   11/07/18 1301     Updated:  11/07/18 1325    Narrative:         EXAM:  Radiograph(s), Osseous         REGION:   Forearm    SIDE:     Right     VIEWS:   2             INDICATION:    cellulitis     COMPARISON:    none              FINDINGS:           No evidence of a fracture or bony malalignment.     No evidence of osteolytic/osteoblastic disease.     Soft tissue irregularity noted posteriorly/proximally.                            .        Impression:       CONCLUSION:           1. No radiographic evidence of acute osseous pathology.              Note:  if pain or symptoms persist beyond reasonable expectations  and follow-up imaging is anticipated,  cross sectional imaging  (contrast-enhanced MRI) is suggested, as is deemed clinically  appropriate.                      Electronically signed by:  YADI Rondon MD  11/7/2018 1:24  PM [facility] Workstation: [id number]            Assessment:    Active Hospital Problems    Diagnosis   • Cellulitis of right upper extremity   Abscess versus infected olecranon bursitis of the right elbow and forearm          Plan:    1. IV Vancomycin and Cefipime  2. CT of the right extremity for further evaluation of abscess/infection  3. Venous doppler US of right upper extremity to rule out DVT  4. IV Fluids  5. Re-check ESR and CRP   6. Pain control as appropriate            This document has been electronically signed by TANYA Norton on November 7, 2018 3:57 PM

## 2018-11-07 NOTE — PROGRESS NOTES
"Pharmacokinetics by Pharmacy - Vancomycin Initial Consult    David Mendoza is a 33 y.o. male being initiated on vancomycin for skin and soft tissue infection. Patient is also receiving Cefepime.      Objective:     [Ht: 195.6 cm (77\"); Wt: 92.5 kg (204 lb)]     Lab Results   Component Value Date    WBC 9.90 (H) 11/07/2018    WBC 11.76 (H) 11/06/2018      Lab Results   Component Value Date    CRP 29.30 (H) 11/07/2018    LACTATE 0.7 11/06/2018      Temp Readings from Last 1 Encounters:   11/07/18 99.3 °F (37.4 °C) (Oral)     Estimated Creatinine Clearance: 143.2 mL/min (by C-G formula based on SCr of 0.96 mg/dL).   Lab Results   Component Value Date    CREATININE 0.96 11/07/2018    CREATININE 0.86 11/06/2018       Baseline culture results:  Microbiology Results (last 10 days)       Procedure Component Value - Date/Time    Wound Culture - Wound, Arm, Right [[id number]]  (Abnormal) Collected:  11/06/18 0415    Lab Status:  Preliminary result Specimen:  Wound from Arm, Right Updated:  11/07/18 0752     Wound Culture Heavy growth (4+) Staphylococcus, coagulase positive (A)     Gram Stain Result Many (4+) WBCs seen      Moderate (3+) Gram positive cocci    Blood Culture - Blood, [[id number]]  (Normal) Collected:  11/06/18 0250    Lab Status:  Preliminary result Specimen:  Blood from Arm, Left Updated:  11/07/18 0300     Blood Culture No growth at 24 hours    Blood Culture - Blood, [[id number]]  (Normal) Collected:  11/06/18 0217    Lab Status:  Preliminary result Specimen:  Blood from Arm, Left Updated:  11/07/18 0230     Blood Culture No growth at 24 hours               Assessment  WBC 9.9  SCr 0.96  CRP 29.3    11/6 blood culture - no growth at 24 hours  11/6 wound culture (right arm) - heavy growth of     Received 1750 mg x1 11/7 at approximately 1600    Will utilize traditional dosing given SSTI  Will start with Q12 hour dosing, however patient may require Q8 hour dosing given renal function       Plan  1. 1750 mg " Q12 to start 11/8 at 0400  2. Trough ordered for 11/9 at 0330.  3. Pharmacy will monitor renal function and adjust dose accordingly.    Tania More RPH  11/07/18 4:13 PM

## 2018-11-08 LAB
ALBUMIN SERPL-MCNC: 3.5 G/DL (ref 3.4–4.8)
ALBUMIN/GLOB SERPL: 1.1 G/DL (ref 1.1–1.8)
ALP SERPL-CCNC: 154 U/L (ref 38–126)
ALT SERPL W P-5'-P-CCNC: 120 U/L (ref 21–72)
AMPHET+METHAMPHET UR QL: NEGATIVE
ANION GAP SERPL CALCULATED.3IONS-SCNC: 6 MMOL/L (ref 5–15)
AST SERPL-CCNC: 60 U/L (ref 17–59)
BACTERIA SPEC AEROBE CULT: ABNORMAL
BACTERIA SPEC AEROBE CULT: ABNORMAL
BARBITURATES UR QL SCN: NEGATIVE
BASOPHILS # BLD AUTO: 0.03 10*3/MM3 (ref 0–0.2)
BASOPHILS NFR BLD AUTO: 0.3 % (ref 0–2)
BENZODIAZ UR QL SCN: NEGATIVE
BILIRUB SERPL-MCNC: 1 MG/DL (ref 0.2–1.3)
BUN BLD-MCNC: 9 MG/DL (ref 7–21)
BUN/CREAT SERPL: 10 (ref 7–25)
CALCIUM SPEC-SCNC: 8.6 MG/DL (ref 8.4–10.2)
CANNABINOIDS SERPL QL: NEGATIVE
CHLORIDE SERPL-SCNC: 104 MMOL/L (ref 95–110)
CO2 SERPL-SCNC: 25 MMOL/L (ref 22–31)
COCAINE UR QL: NEGATIVE
CREAT BLD-MCNC: 0.9 MG/DL (ref 0.7–1.3)
CRP SERPL-MCNC: 23.5 MG/DL (ref 0–1)
DEPRECATED RDW RBC AUTO: 41.1 FL (ref 35.1–43.9)
EOSINOPHIL # BLD AUTO: 0.39 10*3/MM3 (ref 0–0.7)
EOSINOPHIL NFR BLD AUTO: 4.1 % (ref 0–7)
ERYTHROCYTE [DISTWIDTH] IN BLOOD BY AUTOMATED COUNT: 12.7 % (ref 11.5–14.5)
ERYTHROCYTE [SEDIMENTATION RATE] IN BLOOD: 50 MM/HR (ref 0–15)
GFR SERPL CREATININE-BSD FRML MDRD: 97 ML/MIN/1.73 (ref 70–162)
GLOBULIN UR ELPH-MCNC: 3.1 GM/DL (ref 2.3–3.5)
GLUCOSE BLD-MCNC: 93 MG/DL (ref 60–100)
GRAM STN SPEC: ABNORMAL
HCT VFR BLD AUTO: 38.3 % (ref 39–49)
HGB BLD-MCNC: 13.5 G/DL (ref 13.7–17.3)
IMM GRANULOCYTES # BLD: 0.09 10*3/MM3 (ref 0–0.02)
IMM GRANULOCYTES NFR BLD: 0.9 % (ref 0–0.5)
LYMPHOCYTES # BLD AUTO: 1.81 10*3/MM3 (ref 0.6–4.2)
LYMPHOCYTES NFR BLD AUTO: 19.1 % (ref 10–50)
MCH RBC QN AUTO: 30.8 PG (ref 26.5–34)
MCHC RBC AUTO-ENTMCNC: 35.2 G/DL (ref 31.5–36.3)
MCV RBC AUTO: 87.2 FL (ref 80–98)
METHADONE UR QL SCN: NEGATIVE
MONOCYTES # BLD AUTO: 1.21 10*3/MM3 (ref 0–0.9)
MONOCYTES NFR BLD AUTO: 12.8 % (ref 0–12)
NEUTROPHILS # BLD AUTO: 5.95 10*3/MM3 (ref 2–8.6)
NEUTROPHILS NFR BLD AUTO: 62.8 % (ref 37–80)
OPIATES UR QL: POSITIVE
OXYCODONE UR QL SCN: NEGATIVE
PLATELET # BLD AUTO: 186 10*3/MM3 (ref 150–450)
PMV BLD AUTO: 10.2 FL (ref 8–12)
POTASSIUM BLD-SCNC: 4.3 MMOL/L (ref 3.5–5.1)
PROT SERPL-MCNC: 6.6 G/DL (ref 6.3–8.6)
RBC # BLD AUTO: 4.39 10*6/MM3 (ref 4.37–5.74)
SODIUM BLD-SCNC: 135 MMOL/L (ref 137–145)
WBC NRBC COR # BLD: 9.48 10*3/MM3 (ref 3.2–9.8)

## 2018-11-08 PROCEDURE — 80307 DRUG TEST PRSMV CHEM ANLYZR: CPT | Performed by: FAMILY MEDICINE

## 2018-11-08 PROCEDURE — 86140 C-REACTIVE PROTEIN: CPT | Performed by: FAMILY MEDICINE

## 2018-11-08 PROCEDURE — 85025 COMPLETE CBC W/AUTO DIFF WBC: CPT | Performed by: FAMILY MEDICINE

## 2018-11-08 PROCEDURE — 25010000002 CEFTRIAXONE PER 250 MG: Performed by: FAMILY MEDICINE

## 2018-11-08 PROCEDURE — 80053 COMPREHEN METABOLIC PANEL: CPT | Performed by: FAMILY MEDICINE

## 2018-11-08 PROCEDURE — 25010000002 VANCOMYCIN 1 G RECONSTITUTED SOLUTION: Performed by: FAMILY MEDICINE

## 2018-11-08 PROCEDURE — 25010000002 CEFEPIME PER 500 MG: Performed by: FAMILY MEDICINE

## 2018-11-08 PROCEDURE — 99231 SBSQ HOSP IP/OBS SF/LOW 25: CPT | Performed by: ORTHOPAEDIC SURGERY

## 2018-11-08 PROCEDURE — 85651 RBC SED RATE NONAUTOMATED: CPT | Performed by: FAMILY MEDICINE

## 2018-11-08 RX ADMIN — MAGNESIUM SULFATE 20 G: 1 CRYSTAL ORAL; TOPICAL at 20:44

## 2018-11-08 RX ADMIN — MAGNESIUM SULFATE 20 G: 1 CRYSTAL ORAL; TOPICAL at 17:34

## 2018-11-08 RX ADMIN — CEFTRIAXONE SODIUM 1 G: 1 INJECTION, POWDER, FOR SOLUTION INTRAMUSCULAR; INTRAVENOUS at 12:44

## 2018-11-08 RX ADMIN — Medication 3 ML: at 10:20

## 2018-11-08 RX ADMIN — VANCOMYCIN HYDROCHLORIDE 1750 MG: 1 INJECTION, POWDER, LYOPHILIZED, FOR SOLUTION INTRAVENOUS at 04:12

## 2018-11-08 RX ADMIN — CEFEPIME HYDROCHLORIDE 1 G: 1 INJECTION, POWDER, FOR SOLUTION INTRAMUSCULAR; INTRAVENOUS at 03:35

## 2018-11-08 RX ADMIN — Medication 3 ML: at 20:44

## 2018-11-08 RX ADMIN — MAGNESIUM SULFATE 20 G: 1 CRYSTAL ORAL; TOPICAL at 10:20

## 2018-11-08 RX ADMIN — VANCOMYCIN HYDROCHLORIDE 1750 MG: 1 INJECTION, POWDER, LYOPHILIZED, FOR SOLUTION INTRAVENOUS at 17:33

## 2018-11-08 NOTE — PROGRESS NOTES
Nutrition Services    Patient Name:  David Mendoza  YOB: 1985  MRN: [id number]  Admit Date:  11/7/2018    Visited pt during meal rounds. Pt is on a regular diet admitted with cellulitis of right upper extremity. Ate all his breakfast and is not hungry for lunch. Denies weight loss or difficulty eating. Pt eats 2 meals per day at home. Will provide colorful menu for variety in meals. Will check on patient if consulted.    Electronically signed by:  Valeri Rivera RD  11/08/18 2:51 PM

## 2018-11-08 NOTE — PAYOR COMM NOTE
"David Ferguson Rishi (33 y.o. Male)     Date of Birth Social Security Number Address Home Phone MRN    [id number]  [id number] [facility]  [address]   [facility] [address] [phone] [id number]    Nondenominational Marital Status          None Single       Admission Date Admission Type Admitting Provider Attending Provider Department, Room/Bed    11/7/18 Emergency Levy Chung MD Bleibel, Fadi, MD [address], 426/1    Discharge Date Discharge Disposition Discharge Destination                       Attending Provider:  Levy Chung MD    Allergies:  No Known Allergies    Isolation:  Contact   Infection:  MRSA (11/08/18)   Code Status:  CPR    Ht:  195.6 cm (77\")   Wt:  90.8 kg (200 lb 2 oz)    Admission Cmt:  None   Principal Problem:  Cellulitis of right upper extremity [L03.113]                 Active Insurance as of 11/7/2018     Primary Coverage     Payor Plan Insurance Group Employer/Plan Group    [employer] [id number]     Payor Plan Address Payor Plan Phone Number Effective From Effective To    [address] [phone] 2/17/2018     [facility] [id number]       Subscriber Name Subscriber Birth Date Member ID       DAVID FERGUSON 1985 [id number]                 Emergency Contacts      (Rel.) Home Phone Work Phone Mobile Phone    Holly Pedro (Significant Other) [phone] -- [phone]    Candy Ferguson (Mother) [phone] -- [phone]               History & Physical      Rishi Interiano PA at 11/7/2018  3:23 PM     Attestation signed by Levy Chung MD at 11/7/2018  3:59 PM    I agree with the above note and assessment and plan.    Physical exam:    Temp:  [99.2 °F (37.3 °C)] 99.2 °F (37.3 °C)  Heart Rate:  [75-84] 75  Resp:  [20-24] 20  BP: (106-115)/(64-75) 106/64    CVS: S1S2 RRR                          [facility] Admission      Date of Admission: 11/7/2018      Primary Care Physician: Trish" MD Michelle      Chief Complaint: Infection of right upper extremity    HPI: The patient presents one day post-drainage of an abscess of the right upper extremity. He has a five day history of this infection. He first noted that this started with what he thought was a pimple, but over the last five days it has become larger and more inflamed. He presented to the ER on 11-6-18 where an I&D was performed, the wound was packed, and he was given Bactrim. Since that time his arm has become more swollen and cellulitic. 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    Concurrent Medical History:  Denies past medical history.    Past Surgical History:  Denies past surgical history.    Family History:  Diabetes mellitus, HTN    Social History:  Smokes 1/2 PPD; denies drug use and alcohol abuse.    Allergies: No Known Allergies    Medications:     Prior to Admission medications    Medication Sig Start Date End Date Taking? Authorizing Provider   sulfamethoxazole-trimethoprim (BACTRIM DS,SEPTRA DS) 800-160 MG per tablet Take 1 tablet by mouth 2 (Two) Times a Day for 7 days. 11/6/18 11/7/18 Yes Rajiv Gooden DO     Review of Systems:  Review of Systems   Constitutional: Positive for chills, fatigue and fever.        Subjective fever, no measured fever   HENT: Negative for trouble swallowing.    Eyes: Negative for photophobia and visual disturbance.   Respiratory: Negative for chest tightness, shortness of breath, wheezing and stridor.    Cardiovascular: Negative for chest pain.   Gastrointestinal: Positive for nausea. Negative for abdominal pain, diarrhea and vomiting.   Endocrine: Negative.    Genitourinary: Negative.    Musculoskeletal: Positive for arthralgias and myalgias.   Skin: Positive for color change and wound.        Cellulitis    Neurological: Negative for dizziness, seizures, light-headedness and numbness.   Psychiatric/Behavioral: Negative for agitation.      Otherwise complete ROS is  negative except as mentioned above.    Physical Exam:   Temp:  [99.2 °F (37.3 °C)] 99.2 °F (37.3 °C)  Heart Rate:  [75-84] 75  Resp:  [20-24] 20  BP: (106-115)/(64-75) 106/64  Physical Exam   Constitutional: He is oriented to person, place, and time. He appears well-developed and well-nourished.   HENT:   Head: Normocephalic and atraumatic.   Eyes: Pupils are equal, round, and reactive to light. Conjunctivae are normal.   Neck: Normal range of motion. Neck supple.   Cardiovascular: Normal rate and regular rhythm.    Pulmonary/Chest: Effort normal and breath sounds normal.   Abdominal: Soft. Bowel sounds are normal. He exhibits no distension. There is no tenderness.   Musculoskeletal: He exhibits edema and tenderness.   Diffuse edema of the right elbow joint extending approximately 3-4 inches distal to the elbow and 2-3 inches proximal to the elbow   Neurological: He is alert and oriented to person, place, and time.   Skin: Skin is warm and dry. There is erythema.   Erythematous, indurated, no fluctuance, packing in place.  Open wound from previous incision and drainage   Psychiatric: He has a normal mood and affect. His behavior is normal.       Results Reviewed:  I have personally reviewed current lab, radiology, and data and agree with results.  Lab Results (last 24 hours)     Procedure Component Value Units Date/Time    Urine Drug Screen - Urine, Clean Catch [[id number]]  (Abnormal) Collected:  11/07/18 1231    Specimen:  Urine from Urine, Clean Catch Updated:  11/07/18 1425     Amphetamine Screen, Urine Negative     Barbiturates Screen, Urine Negative     Benzodiazepine Screen, Urine Negative     Cocaine Screen, Urine Negative     Methadone Screen, Urine Negative     Opiate Screen Positive (A)     Oxycodone Screen, Urine Negative     THC, Screen, Urine Negative    Narrative:       Negative Thresholds For Drugs Screened in Urine:     Amphetamines          500 ng/ml  Barbiturates          200 ng/ml  Benzodiazepines        200 ng/ml  Cocaine               150 ng/ml  Methadone             300 ng/mL  Opiates               300 ng/mL  Oxycodone             100 ng/mL  THC                   20 ng/mL    The normal value for all drugs tested is negative. This report includes final unconfirmed screening results.  A positive result by this assay can be, at your request, sent to the Reference Lab for confirmation by gas chromatography. Unconfirmed results must not be used for non-medical purposes, such as employment or legal testing. Clinical consideration should be applied to any drug of abuse test result, particularly when unconfirmed results are used.    Sedimentation Rate [[id number]]  (Abnormal) Collected:  11/07/18 1223    Specimen:  Blood Updated:  11/07/18 1335     Sed Rate 50 (H) mm/hr     Urinalysis, Microscopic Only - Urine, Clean Catch [[id number]]  (Abnormal) Collected:  11/07/18 1231    Specimen:  Urine from Urine, Clean Catch Updated:  11/07/18 1331     RBC, UA None Seen /HPF      WBC, UA 3-5 /HPF      Bacteria, UA Trace (A) /HPF      Squamous Epithelial Cells, UA None Seen /HPF      Hyaline Casts, UA None Seen /LPF      Mucus, UA Trace /HPF      Methodology Manual Light Microscopy    Extra Tubes [[id number]] Collected:  11/07/18 1228    Specimen:  Blood from Blood, Venous Line Updated:  11/07/18 1330    Narrative:       The following orders were created for panel order Extra Tubes.  Procedure                               Abnormality         Status                     ---------                               -----------         ------                     Light Blue Top[[id number]]                                   Final result               Gold Top - [facility][[id number]]                                   Final result                 Please view results for these tests on the individual orders.    Light Blue Top [[id number]] Collected:  11/07/18 1228    Specimen:  Blood Updated:  11/07/18 1330     Extra Tube hold for add-on      Comment: Auto resulted       Gold Top - SST [[id number]] Collected:  11/07/18 1228    Specimen:  Blood Updated:  11/07/18 1330     Extra Tube Hold for add-ons.     Comment: Auto resulted.       C-reactive Protein [[id number]]  (Abnormal) Collected:  11/07/18 1223    Specimen:  Blood Updated:  11/07/18 1259     C-Reactive Protein 29.30 (H) mg/dL     Urinalysis With Microscopic If Indicated (No Culture) - Urine, Clean Catch [[id number]]  (Abnormal) Collected:  11/07/18 1231    Specimen:  Urine from Urine, Clean Catch Updated:  11/07/18 1253     Color, UA Dark Yellow     Appearance, UA Clear     pH, UA 7.5     Specific Gravity, UA 1.030     Glucose, UA Negative     Ketones, UA 15 mg/dL (1+) (A)     Bilirubin, UA Moderate (2+) (A)     Blood, UA Negative     Protein, UA 30 mg/dL (1+) (A)     Leuk Esterase, UA Trace (A)     Nitrite, UA Negative     Urobilinogen, UA 4.0 E.U./dL (A)    Comprehensive Metabolic Panel [[id number]]  (Abnormal) Collected:  11/07/18 1223    Specimen:  Blood Updated:  11/07/18 1248     Glucose 95 mg/dL      BUN 12 mg/dL      Creatinine 0.96 mg/dL      Sodium 135 (L) mmol/L      Potassium 3.6 mmol/L      Chloride 102 mmol/L      CO2 27.0 mmol/L      Calcium 8.8 mg/dL      Total Protein 6.9 g/dL      Albumin 3.70 g/dL      ALT (SGPT) 130 (H) U/L      AST (SGOT) 61 (H) U/L      Alkaline Phosphatase 147 (H) U/L      Total Bilirubin 1.0 mg/dL      eGFR Non African Amer 90 mL/min/1.73      Globulin 3.2 gm/dL      A/G Ratio 1.2 g/dL      BUN/Creatinine Ratio 12.5     Anion Gap 6.0 mmol/L     CBC & Differential [[id number]] Collected:  11/07/18 1223    Specimen:  Blood Updated:  11/07/18 1235    Narrative:       The following orders were created for panel order CBC & Differential.  Procedure                               Abnormality         Status                     ---------                               -----------         ------                     CBC Auto Differential[[id number]]        Abnormal             Final result                 Please view results for these tests on the individual orders.    CBC Auto Differential [[id number]]  (Abnormal) Collected:  11/07/18 1223    Specimen:  Blood Updated:  11/07/18 1235     WBC 9.90 (H) 10*3/mm3      RBC 4.45 10*6/mm3      Hemoglobin 13.7 g/dL      Hematocrit 38.5 (L) %      MCV 86.5 fL      MCH 30.8 pg      MCHC 35.6 g/dL      RDW 12.6 %      RDW-SD 40.5 fl      MPV 10.8 fL      Platelets 180 10*3/mm3      Neutrophil % 71.7 %      Lymphocyte % 13.9 %      Monocyte % 11.0 %      Eosinophil % 2.7 %      Basophil % 0.3 %      Immature Grans % 0.4 %      Neutrophils, Absolute 7.09 10*3/mm3      Lymphocytes, Absolute 1.38 10*3/mm3      Monocytes, Absolute 1.09 (H) 10*3/mm3      Eosinophils, Absolute 0.27 10*3/mm3      Basophils, Absolute 0.03 10*3/mm3      Immature Grans, Absolute 0.04 (H) 10*3/mm3         Imaging Results (last 24 hours)     Procedure Component Value Units Date/Time    XR Humerus Right [[id number]] Collected:  11/07/18 1301     Updated:  11/07/18 1330    Narrative:         EXAM:  Radiograph(s), Osseous         REGION:   Humerus    SIDE:     Right     VIEWS:   2             INDICATION:    cellulitis     COMPARISON:    none              FINDINGS:           No evidence of a fracture or bony malalignment.     No evidence of osteolytic/osteoblastic disease.       .        Impression:       CONCLUSION:           1. No radiographic evidence of acute osseous pathology.              Note:  if pain or symptoms persist beyond reasonable expectations  and follow-up imaging is anticipated,  cross sectional imaging  (CT and/or MRI) is suggested, as is deemed clinically  appropriate.                      Electronically signed by:  YADI Rondon MD  11/7/2018 1:29  PM CST Workstation: [id number]    XR Forearm 2 View Right [[id number]] Collected:  11/07/18 1301     Updated:  11/07/18 1325    Narrative:         EXAM:  Radiograph(s), Osseous         REGION:   Forearm    SIDE:      Right     VIEWS:   2             INDICATION:    cellulitis     COMPARISON:    none              FINDINGS:           No evidence of a fracture or bony malalignment.     No evidence of osteolytic/osteoblastic disease.     Soft tissue irregularity noted posteriorly/proximally.                            .        Impression:       CONCLUSION:           1. No radiographic evidence of acute osseous pathology.              Note:  if pain or symptoms persist beyond reasonable expectations  and follow-up imaging is anticipated,  cross sectional imaging  (contrast-enhanced MRI) is suggested, as is deemed clinically  appropriate.                      Electronically signed by:  YADI Rondon MD  11/7/2018 1:24  PM CST Workstation: [id number]            Assessment:    Active Hospital Problems    Diagnosis   • Cellulitis of right upper extremity   Abscess versus infected olecranon bursitis of the right elbow and forearm          Plan:    1. IV Vancomycin and Cefipime  2. CT of the right extremity for further evaluation of abscess/infection  3. Venous doppler US of right upper extremity to rule out DVT  4. IV Fluids  5. Re-check ESR and CRP   6. Pain control as appropriate            This document has been electronically signed by TANYA Norton on November 7, 2018 3:57 PM          Electronically signed by Levy Chung MD at 11/7/2018  3:59 PM          Emergency Department Notes      Irvin Rangel RN at 11/7/2018 11:58 AM        Pt in ED yesterday morning around 0300 and had an I&D to right arm. Pt has increased redness and swelling to extremity, from hand to axilla.    Electronically signed by Irvin Rangel RN at 11/7/2018 11:59 AM     Michelle Chowdhury RN at 11/7/2018  1:15 PM        Lab notified of need for blood cultures.      Michelle Chowdhury RN  11/07/18 1315      Electronically signed by Michelle Chowdhury RN at 11/7/2018  1:15 PM     Tung Jones MD at 11/7/2018  1:16 PM          Subjective    Patient presents with a five-day history of right upper extremity infection.  Patient noted that this started with a pimple on his forearm.  This pimple became more red and inflamed to an area about palm size.  Patient was seen 2 nights ago in the ED and was decided to incise and drain the area.  There is a moderate amount of purulent material that was expressed from the area.  Patient was given a dose of vancomycin started on Levaquin at home.  Patient has done worse in last 24-48 hours.  Patient is more soft tissue swelling in the forearm as well as tracking up the upper arm involving most the medial portion of the upper arm to the axilla and some on the lateral portion as well.  There is induration in this region or some mottling of the skin.  The actual settings incision and drainage is draining purulent material.            Review of Systems   Constitutional: Positive for fatigue. Negative for appetite change, chills and fever.   HENT: Negative.  Negative for congestion.    Eyes: Negative.  Negative for photophobia and visual disturbance.   Respiratory: Negative.  Negative for cough, chest tightness and shortness of breath.    Cardiovascular: Negative.  Negative for chest pain and palpitations.   Gastrointestinal: Negative.  Negative for abdominal pain, constipation, diarrhea, nausea and vomiting.   Endocrine: Negative.    Genitourinary: Negative.  Negative for decreased urine volume, dysuria, flank pain and hematuria.   Musculoskeletal: Negative.  Negative for arthralgias, back pain, myalgias, neck pain and neck stiffness.   Skin: Negative.  Negative for pallor.   Neurological: Negative.  Negative for dizziness, syncope, weakness, light-headedness, numbness and headaches.   Psychiatric/Behavioral: Negative.  Negative for confusion and suicidal ideas. The patient is not nervous/anxious.    All other systems reviewed and are negative.      History reviewed. No pertinent past medical history.    No Known  Allergies    History reviewed. No pertinent surgical history.    History reviewed. No pertinent family history.    Social History     Social History   • Marital status: Single     Social History Main Topics   • Drug use: Unknown     Other Topics Concern   • Not on file           Objective   Physical Exam   Constitutional: He is oriented to person, place, and time. He appears well-developed and well-nourished. No distress.   HENT:   Head: Normocephalic and atraumatic.   Eyes: Conjunctivae and EOM are normal.   Neck: Normal range of motion. Neck supple. No JVD present.   Cardiovascular: Normal rate, regular rhythm, normal heart sounds and intact distal pulses.  Exam reveals no gallop and no friction rub.    No murmur heard.  Pulmonary/Chest: Effort normal. No respiratory distress. He has no wheezes. He has no rales. He exhibits no tenderness.   Abdominal: Soft. Bowel sounds are normal. He exhibits no distension and no mass. There is no tenderness. There is no rebound and no guarding.   Musculoskeletal: He exhibits edema and tenderness.   Patient with a large area of edema involving the upper extremity from the axilla down through the hand.  There is erythema from the area of the mid forearm up to the axilla.  There is 2+ distal pulses this area there is a range of motion the fingers though this is somewhat tight for the patient with the edema.   Neurological: He is alert and oriented to person, place, and time.   Skin: Skin is warm and dry.   Psychiatric: He has a normal mood and affect. His behavior is normal. Judgment and thought content normal.   Nursing note and vitals reviewed.      Procedures          ED Course    Labs Reviewed   COMPREHENSIVE METABOLIC PANEL - Abnormal; Notable for the following:        Result Value    Sodium 135 (*)     ALT (SGPT) 130 (*)     AST (SGOT) 61 (*)     Alkaline Phosphatase 147 (*)     All other components within normal limits   URINALYSIS W/ MICROSCOPIC IF INDICATED (NO CULTURE) -  Abnormal; Notable for the following:     Ketones, UA 15 mg/dL (1+) (*)     Bilirubin, UA Moderate (2+) (*)     Protein, UA 30 mg/dL (1+) (*)     Leuk Esterase, UA Trace (*)     Urobilinogen, UA 4.0 E.U./dL (*)     All other components within normal limits   C-REACTIVE PROTEIN - Abnormal; Notable for the following:     C-Reactive Protein 29.30 (*)     All other components within normal limits   CBC WITH AUTO DIFFERENTIAL - Abnormal; Notable for the following:     WBC 9.90 (*)     Hematocrit 38.5 (*)     Monocytes, Absolute 1.09 (*)     Immature Grans, Absolute 0.04 (*)     All other components within normal limits   BLOOD CULTURE   BLOOD CULTURE   SEDIMENTATION RATE   URINALYSIS, MICROSCOPIC ONLY   CBC AND DIFFERENTIAL    Narrative:     The following orders were created for panel order CBC & Differential.  Procedure                               Abnormality         Status                     ---------                               -----------         ------                     CBC Auto Differential[[id number]]        Abnormal            Final result                 Please view results for these tests on the individual orders.   EXTRA TUBES    Narrative:     The following orders were created for panel order Extra Tubes.  Procedure                               Abnormality         Status                     ---------                               -----------         ------                     Light Blue Top[[id number]]                                   In process                 Gold Top - SST[[id number]]                                   In process                   Please view results for these tests on the individual orders.   LIGHT BLUE TOP   GOLD TOP - SST       XR Forearm 2 View Right    (Results Pending)   XR Humerus Right    (Results Pending)           Patient with large area cellulitis worsening with home oral antibiotics.  We'll admit the patient for IV antibiotics and further management.             [facility]      Final diagnoses:   Cellulitis of right upper extremity            Tung Jones MD  11/07/18 1323      Electronically signed by Tung Jones MD at 11/7/2018  1:23 PM       Hospital Medications (all)       Dose Frequency Start End    acetaminophen (TYLENOL) tablet 650 mg 650 mg Every 4 Hours PRN 11/7/2018     Sig - Route: Take 2 tablets by mouth Every 4 (Four) Hours As Needed for Mild Pain . - Oral    Cosign for Ordering: Required by Levy Chung MD    cefepime (MAXIPIME) 1 g/100 mL 0.9% NS IVPB (mbp) 1 g Every 8 Hours 11/7/2018 11/17/2018    Sig - Route: Infuse 100 mL into a venous catheter Every 8 (Eight) Hours. - Intravenous    HYDROcodone-acetaminophen (NORCO)  MG per tablet 1 tablet 1 tablet Every 6 Hours PRN 11/7/2018 11/17/2018    Sig - Route: Take 1 tablet by mouth Every 6 (Six) Hours As Needed for Moderate Pain . - Oral    iopamidol (ISOVUE-300) 61 % injection 100 mL 100 mL Once in Imaging 11/7/2018 11/7/2018    Sig - Route: Infuse 100 mL into a venous catheter Once. - Intravenous    iopamidol (ISOVUE-300) 61 % injection 100 mL 100 mL Once in Imaging 11/7/2018     Sig - Route: Infuse 100 mL into a venous catheter Once. - Intravenous    ipratropium-albuterol (DUO-NEB) nebulizer solution 3 mL 3 mL Every 4 Hours PRN 11/7/2018     Sig - Route: Take 3 mL by nebulization Every 4 (Four) Hours As Needed for Shortness of Air. - Nebulization    ketorolac (TORADOL) injection 30 mg 30 mg Every 6 Hours PRN 11/7/2018 11/12/2018    Sig - Route: Infuse 1 mL into a venous catheter Every 6 (Six) Hours As Needed for Moderate Pain . - Intravenous    Cosign for Ordering: Required by Levy Chung MD    magnesium sulfate (EPSOM SALT) granules 20 g 20 g 3 Times Daily 11/7/2018     Sig - Route: Apply 2 packets topically to the appropriate area as directed 3 (Three) Times a Day. - Topical    morphine injection 2 mg 2 mg Every 3 Hours PRN 11/7/2018 11/17/2018    Sig - Route: Infuse 1 mL into a venous  "catheter Every 3 (Three) Hours As Needed for Severe Pain  (Try hydrocodone first). - Intravenous    ondansetron (ZOFRAN) injection 4 mg 4 mg Every 6 Hours PRN 11/7/2018     Sig - Route: Infuse 2 mL into a venous catheter Every 6 (Six) Hours As Needed for Nausea or Vomiting. - Intravenous    Pharmacy to dose vancomycin  Continuous PRN 11/7/2018 11/17/2018    Sig - Route: Continuous As Needed for Consult. - Does not apply    piperacillin-tazobactam (ZOSYN) 3.375 g/100 mL 0.9% NS IVPB (mbp) 3.375 g Once 11/7/2018 11/7/2018    Sig - Route: Infuse 100 mL into a venous catheter 1 (One) Time. - Intravenous    sodium chloride 0.9 % flush 10 mL 10 mL As Needed 11/7/2018     Sig - Route: Infuse 10 mL into a venous catheter As Needed for Line Care. - Intravenous    Linked Group 1:  \"And\" Linked Group Details        sodium chloride 0.9 % flush 3 mL 3 mL Every 12 Hours Scheduled 11/7/2018     Sig - Route: Infuse 3 mL into a venous catheter Every 12 (Twelve) Hours. - Intravenous    Cosign for Ordering: Required by Levy Chung MD    sodium chloride 0.9 % flush 3-10 mL 3-10 mL As Needed 11/7/2018     Sig - Route: Infuse 3-10 mL into a venous catheter As Needed for Line Care. - Intravenous    Cosign for Ordering: Required by Levy Chung MD    vancomycin 1750 mg/500 mL 0.9% NS IVPB ([facility]) 20 mg/kg × 91.6 kg Once 11/7/2018 11/7/2018    Sig - Route: Infuse 500 mL into a venous catheter 1 (One) Time. - Intravenous    vancomycin 1750 mg/500 mL 0.9% NS IVPB ([facility]) 1,750 mg Every 12 Hours 11/8/2018 11/18/2018    Sig - Route: Infuse 500 mL into a venous catheter Every 12 (Twelve) Hours. - Intravenous    ondansetron (ZOFRAN) injection 4 mg (Discontinued) 4 mg Every 6 Hours PRN 11/7/2018 11/7/2018    Sig - Route: Infuse 2 mL into a venous catheter Every 6 (Six) Hours As Needed for Nausea or Vomiting. - Intravenous          Lab Results (last 72 hours)     Procedure Component Value Units Date/Time    C-reactive Protein [[id number]]  " (Abnormal) Collected:  11/08/18 0701    Specimen:  Blood Updated:  11/08/18 0740     C-Reactive Protein 23.50 (H) mg/dL     Wound Culture - Wound, Elbow, Right [[id number]]  (Abnormal) Collected:  11/07/18 1624    Specimen:  Wound from Elbow, Right Updated:  11/08/18 0736     Wound Culture Scant growth (1+) Staphylococcus aureus, MRSA (A)     Comment:   Methicillin resistant Staphylococcus aureus, Patient may be an isolation risk.        Gram Stain Result Rare (1+) WBCs seen      No organisms seen    Narrative:       Slide reviewed confirmed  Multi Drug Resistant Organism  contact precautions requested      for sensitivity see previous report      Comprehensive Metabolic Panel [[id number]]  (Abnormal) Collected:  11/08/18 0701    Specimen:  Blood Updated:  11/08/18 0726     Glucose 93 mg/dL      BUN 9 mg/dL      Creatinine 0.90 mg/dL      Sodium 135 (L) mmol/L      Potassium 4.3 mmol/L      Chloride 104 mmol/L      CO2 25.0 mmol/L      Calcium 8.6 mg/dL      Total Protein 6.6 g/dL      Albumin 3.50 g/dL      ALT (SGPT) 120 (H) U/L      AST (SGOT) 60 (H) U/L      Alkaline Phosphatase 154 (H) U/L      Total Bilirubin 1.0 mg/dL      eGFR Non African Amer 97 mL/min/1.73      Globulin 3.1 gm/dL      A/G Ratio 1.1 g/dL      BUN/Creatinine Ratio 10.0     Anion Gap 6.0 mmol/L     CBC & Differential [[id number]] Collected:  11/08/18 0701    Specimen:  Blood Updated:  11/08/18 0712    Narrative:       The following orders were created for panel order CBC & Differential.  Procedure                               Abnormality         Status                     ---------                               -----------         ------                     CBC Auto Differential[[id number]]        Abnormal            Final result                 Please view results for these tests on the individual orders.    CBC Auto Differential [[id number]]  (Abnormal) Collected:  11/08/18 0701    Specimen:  Blood Updated:  11/08/18 0712     WBC 9.48 10*3/mm3       RBC 4.39 10*6/mm3      Hemoglobin 13.5 (L) g/dL      Hematocrit 38.3 (L) %      MCV 87.2 fL      MCH 30.8 pg      MCHC 35.2 g/dL      RDW 12.7 %      RDW-SD 41.1 fl      MPV 10.2 fL      Platelets 186 10*3/mm3      Neutrophil % 62.8 %      Lymphocyte % 19.1 %      Monocyte % 12.8 (H) %      Eosinophil % 4.1 %      Basophil % 0.3 %      Immature Grans % 0.9 (H) %      Neutrophils, Absolute 5.95 10*3/mm3      Lymphocytes, Absolute 1.81 10*3/mm3      Monocytes, Absolute 1.21 (H) 10*3/mm3      Eosinophils, Absolute 0.39 10*3/mm3      Basophils, Absolute 0.03 10*3/mm3      Immature Grans, Absolute 0.09 (H) 10*3/mm3     Sedimentation Rate [[id number]] Collected:  11/08/18 0701    Specimen:  Blood Updated:  11/08/18 0706    Urine Drug Screen - Urine, Clean Catch [[id number]]  (Abnormal) Collected:  11/08/18 0339    Specimen:  Urine from Urine, Clean Catch Updated:  11/08/18 0443     Amphetamine Screen, Urine Negative     Barbiturates Screen, Urine Negative     Benzodiazepine Screen, Urine Negative     Cocaine Screen, Urine Negative     Methadone Screen, Urine Negative     Opiate Screen Positive (A)     Oxycodone Screen, Urine Negative     THC, Screen, Urine Negative    Narrative:       Negative Thresholds For Drugs Screened in Urine:     Amphetamines          500 ng/ml  Barbiturates          200 ng/ml  Benzodiazepines       200 ng/ml  Cocaine               150 ng/ml  Methadone             300 ng/mL  Opiates               300 ng/mL  Oxycodone             100 ng/mL  THC                   20 ng/mL    The normal value for all drugs tested is negative. This report includes final unconfirmed screening results.  A positive result by this assay can be, at your request, sent to the [facility] for confirmation by gas chromatography. Unconfirmed results must not be used for non-medical purposes, such as employment or legal testing. Clinical consideration should be applied to any drug of abuse test result, particularly when  unconfirmed results are used.    Urine Drug Screen - Urine, Clean Catch [[id number]]  (Abnormal) Collected:  11/07/18 1231    Specimen:  Urine from Urine, Clean Catch Updated:  11/07/18 1425     Amphetamine Screen, Urine Negative     Barbiturates Screen, Urine Negative     Benzodiazepine Screen, Urine Negative     Cocaine Screen, Urine Negative     Methadone Screen, Urine Negative     Opiate Screen Positive (A)     Oxycodone Screen, Urine Negative     THC, Screen, Urine Negative    Narrative:       Negative Thresholds For Drugs Screened in Urine:     Amphetamines          500 ng/ml  Barbiturates          200 ng/ml  Benzodiazepines       200 ng/ml  Cocaine               150 ng/ml  Methadone             300 ng/mL  Opiates               300 ng/mL  Oxycodone             100 ng/mL  THC                   20 ng/mL    The normal value for all drugs tested is negative. This report includes final unconfirmed screening results.  A positive result by this assay can be, at your request, sent to the [facility] for confirmation by gas chromatography. Unconfirmed results must not be used for non-medical purposes, such as employment or legal testing. Clinical consideration should be applied to any drug of abuse test result, particularly when unconfirmed results are used.    Sedimentation Rate [[id number]]  (Abnormal) Collected:  11/07/18 1223    Specimen:  Blood Updated:  11/07/18 1335     Sed Rate 50 (H) mm/hr     Urinalysis, Microscopic Only - Urine, Clean Catch [[id number]]  (Abnormal) Collected:  11/07/18 1231    Specimen:  Urine from Urine, Clean Catch Updated:  11/07/18 1331     RBC, UA None Seen /HPF      WBC, UA 3-5 /HPF      Bacteria, UA Trace (A) /HPF      Squamous Epithelial Cells, UA None Seen /HPF      Hyaline Casts, UA None Seen /LPF      Mucus, UA Trace /HPF      Methodology Manual Light Microscopy    Extra Tubes [[id number]] Collected:  11/07/18 1228    Specimen:  Blood from Blood, Venous Line Updated:  11/07/18 1330     Narrative:       The following orders were created for panel order Extra Tubes.  Procedure                               Abnormality         Status                     ---------                               -----------         ------                     Light Blue Top[[id number]]                                   Final result               Gold Top - SST[[id number]]                                   Final result                 Please view results for these tests on the individual orders.    Light Blue Top [[id number]] Collected:  11/07/18 1228    Specimen:  Blood Updated:  11/07/18 1330     Extra Tube hold for add-on     Comment: Auto resulted       Gold Top - SST [[id number]] Collected:  11/07/18 1228    Specimen:  Blood Updated:  11/07/18 1330     Extra Tube Hold for add-ons.     Comment: Auto resulted.       C-reactive Protein [[id number]]  (Abnormal) Collected:  11/07/18 1223    Specimen:  Blood Updated:  11/07/18 1259     C-Reactive Protein 29.30 (H) mg/dL     Urinalysis With Microscopic If Indicated (No Culture) - Urine, Clean Catch [[id number]]  (Abnormal) Collected:  11/07/18 1231    Specimen:  Urine from Urine, Clean Catch Updated:  11/07/18 1253     Color, UA Dark Yellow     Appearance, UA Clear     pH, UA 7.5     Specific Gravity, UA 1.030     Glucose, UA Negative     Ketones, UA 15 mg/dL (1+) (A)     Bilirubin, UA Moderate (2+) (A)     Blood, UA Negative     Protein, UA 30 mg/dL (1+) (A)     Leuk Esterase, UA Trace (A)     Nitrite, UA Negative     Urobilinogen, UA 4.0 E.U./dL (A)    Comprehensive Metabolic Panel [[id number]]  (Abnormal) Collected:  11/07/18 1223    Specimen:  Blood Updated:  11/07/18 1248     Glucose 95 mg/dL      BUN 12 mg/dL      Creatinine 0.96 mg/dL      Sodium 135 (L) mmol/L      Potassium 3.6 mmol/L      Chloride 102 mmol/L      CO2 27.0 mmol/L      Calcium 8.8 mg/dL      Total Protein 6.9 g/dL      Albumin 3.70 g/dL      ALT (SGPT) 130 (H) U/L      AST (SGOT) 61 (H) U/L       Alkaline Phosphatase 147 (H) U/L      Total Bilirubin 1.0 mg/dL      eGFR Non African Amer 90 mL/min/1.73      Globulin 3.2 gm/dL      A/G Ratio 1.2 g/dL      BUN/Creatinine Ratio 12.5     Anion Gap 6.0 mmol/L     CBC & Differential [[id number]] Collected:  11/07/18 1223    Specimen:  Blood Updated:  11/07/18 1235    Narrative:       The following orders were created for panel order CBC & Differential.  Procedure                               Abnormality         Status                     ---------                               -----------         ------                     CBC Auto Differential[[id number]]        Abnormal            Final result                 Please view results for these tests on the individual orders.    CBC Auto Differential [[id number]]  (Abnormal) Collected:  11/07/18 1223    Specimen:  Blood Updated:  11/07/18 1235     WBC 9.90 (H) 10*3/mm3      RBC 4.45 10*6/mm3      Hemoglobin 13.7 g/dL      Hematocrit 38.5 (L) %      MCV 86.5 fL      MCH 30.8 pg      MCHC 35.6 g/dL      RDW 12.6 %      RDW-SD 40.5 fl      MPV 10.8 fL      Platelets 180 10*3/mm3      Neutrophil % 71.7 %      Lymphocyte % 13.9 %      Monocyte % 11.0 %      Eosinophil % 2.7 %      Basophil % 0.3 %      Immature Grans % 0.4 %      Neutrophils, Absolute 7.09 10*3/mm3      Lymphocytes, Absolute 1.38 10*3/mm3      Monocytes, Absolute 1.09 (H) 10*3/mm3      Eosinophils, Absolute 0.27 10*3/mm3      Basophils, Absolute 0.03 10*3/mm3      Immature Grans, Absolute 0.04 (H) 10*3/mm3           Imaging Results (last 72 hours)     Procedure Component Value Units Date/Time    CT Upper Extremity Right With Contrast [[id number]] Collected:  11/07/18 1537     Updated:  11/07/18 1622    Narrative:         EXAMINATION:  Computed Tomography      REGION:    Right upper extremity  INDICATION:   Evaluate abscess, localize to elbow, forearm,,  L03.113 Cellulitis of right upper limb      COMPARISON:  none           TECHNIQUE:         - reconstructions:     axial, coronal, sagittal         - contrast:    oral:  none  ;  intravenous: Isovue 300, 100  mL          This exam was performed according to the departmental  dose-optimization program which includes automated exposure  control, adjustment of the mA and/or kV according to patient size  and/or use of iterative reconstruction technique.             COMMENTS:              A CT examination was performed of the right upper extremity for  evaluation of clinically apparent cellulitis.  There is diffuse subcutaneous soft tissue infiltration  posteriorly at the level of the elbow with a focal fluid  collection measuring approximately 2 cm in greatest dimension  immediately posterior to the olecranon.  There is a small amount of joint fluid noted posteriorly.  There is no evidence of a lytic bone lesion and adjacent osseous  structures.    .       Impression:       CONCLUSION:          1. Focal fluid collection immediately posterior to the olecranon  presumably representing an abscess, in direct communication with  the subcutaneous compartment posteriorly likely representing  extensive cellulitis.  2. No evidence of a lytic bone lesion to suspect the presence of  osteomyelitis.  3. Focal joint fluid noted posteriorly, suspicious for the  presence of septic arthritis.    Contrast-enhanced MRI recommended as test of choice for further  evaluation for assessment of marrow and joint space.                                          Electronically signed by:  YADI Rondon MD  11/7/2018 4:21  PM CST Workstation: [id number]    US Venous Doppler Upper Extremity Right (duplex) [[id number]] Collected:  11/07/18 1445     Updated:  11/07/18 1612    Narrative:       .  EXAMINATION:  Ultrasound, venous, upper right    CLINICAL INDICATION / HISTORY:  Right arm swelling   COMPARISON:  none  TECHNIQUE:  upper extremity, right                         serial axial graded compression technique                         utilizing grayscale,  color and Doppler     FINDINGS:    Imaged vessels:    - jugular vein            - subclavian vein       - axillary vein            - brachial vein           - basilic vein             - cephalic vein          - radial vein              - ulnar vein               Unless otherwise indicated, all of the above described vessels  were without evidence of an intraluminal defect and demonstrated  spontaneous and phasic flow as well as appropriate flow with  augmentation.         .      Impression:       CONCLUSION:     1. Negative examination - no evidence of  venous thrombosis of  the right upper extremity.               Electronically signed by:  YADI Rondon MD  11/7/2018 4:11  PM CST Workstation: [id number]    XR Chest Post CVA Port [[id number]] Collected:  11/07/18 1525     Updated:  11/07/18 1544    Narrative:       Chest single view.          CLINICAL INDICATION: Cellulitis. IV access.    COMPARISON: None    FINDINGS: Cardiac silhouette is normal in size. Pulmonary  vascularity is unremarkable.     No focal infiltrate or consolidation.  No pleural effusion.  No  pneumothorax.      Impression:       CONCLUSION: Left arm PICC line catheter with catheter tip in  superior vena cava in satisfactory position.    Electronically signed by:  Trung Chung MD  11/7/2018 3:43 PM CST  Workstation: [id number]    IR PICC wo fluoro guidance [[id number]] Resulted:  11/07/18 1542     Updated:  11/07/18 1542    Narrative:       This procedure was auto-finalized with no dictation required.    US Guided Vascular Access [[id number]] Resulted:  11/07/18 1538     Updated:  11/07/18 1538    Narrative:       This procedure was auto-finalized with no dictation required.    XR Humerus Right [[id number]] Collected:  11/07/18 1301     Updated:  11/07/18 1330    Narrative:         EXAM:  Radiograph(s), Osseous         REGION:   Humerus    SIDE:     Right     VIEWS:   2             INDICATION:    cellulitis     COMPARISON:    none               FINDINGS:           No evidence of a fracture or bony malalignment.     No evidence of osteolytic/osteoblastic disease.       .        Impression:       CONCLUSION:           1. No radiographic evidence of acute osseous pathology.              Note:  if pain or symptoms persist beyond reasonable expectations  and follow-up imaging is anticipated,  cross sectional imaging  (CT and/or MRI) is suggested, as is deemed clinically  appropriate.                      Electronically signed by:  YADI Rondon MD  11/7/2018 1:29  PM CST Workstation: [id number]    XR Forearm 2 View Right [[id number]] Collected:  11/07/18 1301     Updated:  11/07/18 1325    Narrative:         EXAM:  Radiograph(s), Osseous         REGION:   Forearm    SIDE:     Right     VIEWS:   2             INDICATION:    cellulitis     COMPARISON:    none              FINDINGS:           No evidence of a fracture or bony malalignment.     No evidence of osteolytic/osteoblastic disease.     Soft tissue irregularity noted posteriorly/proximally.                            .        Impression:       CONCLUSION:           1. No radiographic evidence of acute osseous pathology.              Note:  if pain or symptoms persist beyond reasonable expectations  and follow-up imaging is anticipated,  cross sectional imaging  (contrast-enhanced MRI) is suggested, as is deemed clinically  appropriate.                      Electronically signed by:  YADI Rondon MD  11/7/2018 1:24  PM CST Workstation: [id number]          ECG/EMG Results (last 72 hours)     ** No results found for the last 72 hours. **           Physician Progress Notes (last 72 hours) (Notes from 11/5/2018  8:07 AM through 11/8/2018  8:07 AM)      Irvin Naylor MD at 11/8/2018  7:55 AM          ORTHOPEDIC PROGRESS NOTE:    Name:  David Mendoza  Date:    11/8/2018  Date of admission:  11/7/2018    Subjective:  Pain and swelling slightly improved.  No new complaints  No fever or  chills    Vitals:    Vitals:    11/08/18 0342   BP: 111/75   Pulse: 62   Resp: 18   Temp: 98.2 °F (36.8 °C)   SpO2: 98%       Exam:  Awake and alert  Answers questions appropriately  Swelling improved in hand.  Erythema in arm is less intense and has not spread.  Tenderness is improved.  Still with good elbow motion  Good cap refill and sensation      Results from last 7 days  Lab Units 11/08/18  0701 11/07/18  1223 11/06/18  0218   WBC 10*3/mm3 9.48 9.90* 11.76*   HEMOGLOBIN g/dL 13.5* 13.7 13.3*   HEMATOCRIT % 38.3* 38.5* 36.9*   PLATELETS 10*3/mm3 186 180 179         Results from last 7 days  Lab Units 11/08/18  0701 11/07/18  1223   CRP mg/dL 23.50* 29.30*         ASSESSMENT:  Active Hospital Problems    Diagnosis Date Noted   • **Cellulitis of right upper extremity [L03.113] 11/07/2018         PLAN:    Continue with Iv antibiotics  Continue epsom salt soaks.  Swelling control  Finger motion  Continue observation    11/08/18 at 7:57 AM by Fili Naylor MD        Electronically signed by Fili Naylor MD at 11/8/2018  8:00 AM          Consult Notes (last 72 hours) (Notes from 11/5/2018  8:07 AM through 11/8/2018  8:07 AM)      Fili Naylor MD at 11/7/2018  6:27 PM      Consult Orders:    1. Inpatient Orthopedic Surgery Consult [[id number]] ordered by Rishi Interiano PA at 11/07/18 1355                Inpatient Orthopedic Surgery Consult  Consult performed by: FILI NAYLOR  Consult ordered by: RISHI INTERIANO  Reason for consult: cellulitis right arm          Patient Care Team:  Michelle Cahmbers MD as PCP - General (Family Medicine)    Chief complaint: Right elbow and arm pain    Subjective     History of Present Illness     The patient is a 33-year-old white male with no specific report of injury that began having pain and swelling in his right arm a few days ago.  He was seen in the emergency department a couple of days ago at which time he had a small incision and  drainage and had the area packed with iodoform gauze.  He then had worsening pain and cellulitis of his arm and returned today.  He was admitted and placed on IV antibiotics.  Consult was obtained to evaluate for cellulitis, treatment, and assessment of possible septic arthritis of the right elbow.  He states the pain has been severe at times.  He denies numbness or tingling.    Review of Systems   Constitutional: Positive for chills. Negative for fever.   Respiratory: Negative.    Cardiovascular: Negative.    Gastrointestinal: Negative.         Past Medical History:   Diagnosis Date   • Cellulitis 11/05/2018   , History reviewed. No pertinent surgical history., History reviewed. No pertinent family history.,   Social History   Substance Use Topics   • Smoking status: Current Every Day Smoker     Packs/day: 0.50     Years: 10.00   • Smokeless tobacco: Not on file   • Alcohol use No   ,   Prescriptions Prior to Admission   Medication Sig Dispense Refill Last Dose   • HYDROcodone-acetaminophen (NORCO)  MG per tablet Take 1 tablet by mouth Every 6 (Six) Hours As Needed for Moderate Pain .   11/7/2018 at 1100   • venlafaxine (EFFEXOR) 75 MG tablet Take 75 mg by mouth Daily.   11/6/2018    and Allergies:  Patient has no known allergies.    Objective      Vital Signs  Temp:  [99.2 °F (37.3 °C)-99.3 °F (37.4 °C)] 99.3 °F (37.4 °C)  Heart Rate:  [71-84] 71  Resp:  [20-24] 20  BP: (106-118)/(64-75) 118/75    Physical Exam   Constitutional: He is oriented to person, place, and time. He appears well-developed and well-nourished. No distress.   Eyes: Pupils are equal, round, and reactive to light. Conjunctivae are normal.   Neck: Neck supple. No tracheal deviation present. No thyromegaly present.   Cardiovascular: Normal rate and intact distal pulses.    Pulmonary/Chest: Effort normal. He exhibits no tenderness.   Abdominal: Soft. He exhibits no distension and no mass. There is no tenderness.   Musculoskeletal:   Left  upper extremity is within normal limits with good distal pulses and sensation, full range of motion, nontender on exam, and good muscle tone and strength.    Right upper extremity shows significant cellulitis from mid forearm on the lateral and posterior aspect of the elbow.  Cellulitis does extend along the medial aspect of the upper arm up to the axilla as well as on the lateral aspect of the arm to just below the shoulder.  He is able to extend his arm to about -10° and flex to 130°.  He does have nearly full pronation and supination and has good fist formation as well.   Neurological: He is alert and oriented to person, place, and time.   Skin: Skin is warm. No rash noted.   Psychiatric: He has a normal mood and affect. His behavior is normal. Judgment and thought content normal.       Results Review:      Xr Humerus Right    Result Date: 11/7/2018  Narrative: EXAM:  Radiograph(s), Osseous       REGION:   Humerus  SIDE:     Right   VIEWS:   2         INDICATION:    cellulitis   COMPARISON:    none          FINDINGS:       No evidence of a fracture or bony malalignment.   No evidence of osteolytic/osteoblastic disease.   .        Impression: CONCLUSION:       1. No radiographic evidence of acute osseous pathology.          Note:  if pain or symptoms persist beyond reasonable expectations and follow-up imaging is anticipated,  cross sectional imaging (CT and/or MRI) is suggested, as is deemed clinically appropriate.            Electronically signed by:  YADI Rondon MD  11/7/2018 1:29 PM CST Workstation: [id number]    Xr Forearm 2 View Right    Result Date: 11/7/2018  Narrative: EXAM:  Radiograph(s), Osseous       REGION:   Forearm  SIDE:     Right   VIEWS:   2         INDICATION:    cellulitis   COMPARISON:    none          FINDINGS:       No evidence of a fracture or bony malalignment.   No evidence of osteolytic/osteoblastic disease.   Soft tissue irregularity noted posteriorly/proximally.                          .        Impression: CONCLUSION:       1. No radiographic evidence of acute osseous pathology.          Note:  if pain or symptoms persist beyond reasonable expectations and follow-up imaging is anticipated,  cross sectional imaging (contrast-enhanced MRI) is suggested, as is deemed clinically appropriate.            Electronically signed by:  YADI Rondon MD  11/7/2018 1:24 PM CST Workstation: [id number]    Us Guided Vascular Access    Result Date: 11/7/2018  Narrative: This procedure was auto-finalized with no dictation required.    Us Venous Doppler Upper Extremity Right (duplex)    Result Date: 11/7/2018  Narrative: . EXAMINATION:  Ultrasound, venous, upper right CLINICAL INDICATION / HISTORY:  Right arm swelling COMPARISON:  none TECHNIQUE:  upper extremity, right                        serial axial graded compression technique                        utilizing grayscale, color and Doppler FINDINGS: Imaged vessels:   - jugular vein          - subclavian vein     - axillary vein          - brachial vein         - basilic vein           - cephalic vein        - radial vein            - ulnar vein           Unless otherwise indicated, all of the above described vessels were without evidence of an intraluminal defect and demonstrated spontaneous and phasic flow as well as appropriate flow with augmentation.      .      Impression: CONCLUSION:   1. Negative examination - no evidence of  venous thrombosis of the right upper extremity.     Electronically signed by:  YADI Rondon MD  11/7/2018 4:11 PM CST Workstation: [id number]    Ct Upper Extremity Right With Contrast    Result Date: 11/7/2018  Narrative: EXAMINATION:  Computed Tomography    REGION:    Right upper extremity INDICATION:   Evaluate abscess, localize to elbow, forearm,, L03.113 Cellulitis of right upper limb    COMPARISON:  none         TECHNIQUE:       - reconstructions:    axial, coronal, sagittal       - contrast:    oral:  none  ;   intravenous: Isovue 300, 100 mL        This exam was performed according to the departmental dose-optimization program which includes automated exposure control, adjustment of the mA and/or kV according to patient size and/or use of iterative reconstruction technique.         COMMENTS:          A CT examination was performed of the right upper extremity for evaluation of clinically apparent cellulitis. There is diffuse subcutaneous soft tissue infiltration posteriorly at the level of the elbow with a focal fluid collection measuring approximately 2 cm in greatest dimension immediately posterior to the olecranon. There is a small amount of joint fluid noted posteriorly. There is no evidence of a lytic bone lesion and adjacent osseous structures. .       Impression: CONCLUSION:      1. Focal fluid collection immediately posterior to the olecranon presumably representing an abscess, in direct communication with the subcutaneous compartment posteriorly likely representing extensive cellulitis. 2. No evidence of a lytic bone lesion to suspect the presence of osteomyelitis. 3. Focal joint fluid noted posteriorly, suspicious for the presence of septic arthritis. Contrast-enhanced MRI recommended as test of choice for further evaluation for assessment of marrow and joint space.                        Electronically signed by:  YADI Rondon MD  11/7/2018 4:21 PM CST Workstation: [phone]    Xr Chest Post Cva Port    Result Date: 11/7/2018  Narrative: Chest single view. CLINICAL INDICATION: Cellulitis. IV access. COMPARISON: None FINDINGS: Cardiac silhouette is normal in size. Pulmonary vascularity is unremarkable. No focal infiltrate or consolidation.  No pleural effusion.  No pneumothorax.     Impression: CONCLUSION: Left arm PICC line catheter with catheter tip in superior vena cava in satisfactory position. Electronically signed by:  Trung Chung MD  11/7/2018 3:43 PM CST Workstation: [id number]    Ir Picc Wo Fluoro  Guidance    Result Date: 11/7/2018  Narrative: This procedure was auto-finalized with no dictation required.         I reviewed the patient's new clinical results.  I reviewed the patient's new imaging results and agree with the interpretation.        Assessment/Plan       Cellulitis of right upper extremity      Assessment & Plan     Long discussion was had with the patient and his family regarding further treatment options.  He had a large segment of iodoform gauze that was stuffed into a wound on the posterior lateral aspect of the proximal forearm.  This gauze was removed leaving and open wound into the soft tissue there.  I am not suspicious for septic arthritis at this time as the patient has significant ability to flex and extend as well as pronate and supinate.  I recommend adding Zosyn to his IV antibiotics and continue with observation and elevation of the arm.  Recheck in the morning to assess for improvement.  We will also assess for away in which to do Epson salt soaks on this wound.  He will also have wet-to-dry dressings on the open wound 3 times a day.  Continue to monitor the change in the CRP value as treatment ensues.    The patient is also on Cefipime and therefore zosyn will not be added.    I discussed the patients findings and my recommendations with patient, family, nursing staff and consulting provider    Irvin Naylor MD  11/07/18  6:27 PM            Electronically signed by Irvin Naylor MD at 11/7/2018  6:41 PM

## 2018-11-08 NOTE — PLAN OF CARE
Problem: Skin and Soft Tissue Infection (Adult)  Goal: Signs and Symptoms of Listed Potential Problems Will be Absent, Minimized or Managed (Skin and Soft Tissue Infection)  Outcome: Ongoing (interventions implemented as appropriate)      Problem: Pain, Acute (Adult)  Goal: Acceptable Pain Control/Comfort Level  Outcome: Ongoing (interventions implemented as appropriate)      Problem: Patient Care Overview  Goal: Plan of Care Review  Outcome: Ongoing (interventions implemented as appropriate)   11/07/18 0249   Coping/Psychosocial   Plan of Care Reviewed With patient;significant other   Plan of Care Review   Progress no change   OTHER   Outcome Summary Patient resting in bed. VS stable at beginning of shift. Right arm soaked in epsom salt per Dr. Naylor. orders. Patient's arm rewrapped and packed after per Dr. Naylor orders. No s/s of distress at this time. Will continue to monitor this patient.      Goal: Discharge Needs Assessment  Outcome: Ongoing (interventions implemented as appropriate)

## 2018-11-08 NOTE — PROGRESS NOTES
ORTHOPEDIC PROGRESS NOTE:    Name:  David Mendoza  Date:    11/8/2018  Date of admission:  11/7/2018    Subjective:  Pain and swelling slightly improved.  No new complaints  No fever or chills    Vitals:    Vitals:    11/08/18 0342   BP: 111/75   Pulse: 62   Resp: 18   Temp: 98.2 °F (36.8 °C)   SpO2: 98%       Exam:  Awake and alert  Answers questions appropriately  Swelling improved in hand.  Erythema in arm is less intense and has not spread.  Tenderness is improved.  Still with good elbow motion  Good cap refill and sensation      Results from last 7 days  Lab Units 11/08/18  0701 11/07/18  1223 11/06/18  0218   WBC 10*3/mm3 9.48 9.90* 11.76*   HEMOGLOBIN g/dL 13.5* 13.7 13.3*   HEMATOCRIT % 38.3* 38.5* 36.9*   PLATELETS 10*3/mm3 186 180 179         Results from last 7 days  Lab Units 11/08/18  0701 11/07/18  1223   CRP mg/dL 23.50* 29.30*         ASSESSMENT:  Active Hospital Problems    Diagnosis Date Noted   • **Cellulitis of right upper extremity [L03.113] 11/07/2018         PLAN:    Continue with Iv antibiotics  Continue epsom salt soaks.  Swelling control  Finger motion  Continue observation    11/08/18 at 7:57 AM by Irvin Naylor MD

## 2018-11-08 NOTE — PROGRESS NOTES
[facility]  INPATIENT PROGRESS NOTE    Length of Stay: 1  Date of Admission: 11/7/2018  Primary Care Physician: Michelle Chambers MD    Subjective     Chief Complaint   Patient presents with   • Wound Infection     HPI: 33-year-old male who was admitted for right arm swelling.  Patient is status post I&D which was performed on 11/6/18 in the ER.  However he stated that his swelling and cellulitis has gotten worse.  Patient was presented back to the ER and admitted for IV antibiotics.    11/8/18: Patient has been started on vancomycin, he is positive for MRSA.  Dr. Naylor has seen and evaluated the patient continue with current therapy.    Review of Systems   Constitutional: Positive for activity change, appetite change, fatigue and fever.   HENT: Negative for congestion and rhinorrhea.    Respiratory: Negative for chest tightness, shortness of breath and wheezing.    Cardiovascular: Negative for chest pain, palpitations and leg swelling.   Gastrointestinal: Negative for abdominal pain, blood in stool, diarrhea, nausea and vomiting.   Genitourinary: Negative for dysuria, frequency and urgency.   Musculoskeletal: Positive for joint swelling. Negative for back pain.   Skin: Positive for color change, rash and wound.   Neurological: Negative for dizziness, syncope, light-headedness and headaches.   Hematological: Negative for adenopathy. Does not bruise/bleed easily.   Psychiatric/Behavioral: Negative for agitation, behavioral problems and confusion.        All pertinent negatives and positives are as above. All other systems have been reviewed and are negative unless otherwise stated.     Objective      Vital Sign Min/Max for last 24 hours  Temp  Min: 98.2 °F (36.8 °C)  Max: 100.2 °F (37.9 °C)   BP  Min: 98/58  Max: 118/75   Pulse  Min: 62  Max: 75   Resp  Min: 18  Max: 20   SpO2  Min: 98 %  Max: 100 %   No Data Recorded   Weight  Min: 90.8 kg (200 lb 2 oz)   Max: 92.5 kg (204 lb)         Physical Exam   Constitutional: He is oriented to person, place, and time. He appears well-developed and well-nourished.   HENT:   Head: Normocephalic.   Eyes: Pupils are equal, round, and reactive to light. EOM are normal.   Neck: Normal range of motion.   Cardiovascular: Normal rate, regular rhythm and normal heart sounds.    Pulmonary/Chest: Effort normal and breath sounds normal.   Abdominal: Soft. Bowel sounds are normal.   Musculoskeletal: Normal range of motion.   Neurological: He is alert and oriented to person, place, and time.   Skin: Abrasion, lesion and rash noted. There is erythema.        Psychiatric: He has a normal mood and affect. His behavior is normal.   Vitals reviewed.      Current Facility-Administered Medications   Medication Dose Route Frequency Provider Last Rate Last Dose   • acetaminophen (TYLENOL) tablet 650 mg  650 mg Oral Q4H PRN Rishi Interiano PA       • cefTRIAXone (ROCEPHIN) 1 g/100 mL 0.9% NS (MBP)  1 g Intravenous Q24H Jermaine Reyes MD       • HYDROcodone-acetaminophen (NORCO)  MG per tablet 1 tablet  1 tablet Oral Q6H PRN Levy Chung MD       • iopamidol (ISOVUE-300) 61 % injection 100 mL  100 mL Intravenous Once in imaging Irvin Naylor MD       • ipratropium-albuterol (DUO-NEB) nebulizer solution 3 mL  3 mL Nebulization Q4H PRN Levy Chung MD       • ketorolac (TORADOL) injection 30 mg  30 mg Intravenous Q6H PRN Rishi Interiano PA       • magnesium sulfate (EPSOM SALT) granules 20 g  20 g Topical TID Irvin Naylor MD   20 g at 11/08/18 1020   • morphine injection 2 mg  2 mg Intravenous Q3H PRN Levy Chung MD       • ondansetron (ZOFRAN) injection 4 mg  4 mg Intravenous Q6H PRN Levy Chung MD       • Pharmacy to dose vancomycin   Does not apply Continuous PRN Levy Chung MD       • sodium chloride 0.9 % flush 10 mL  10 mL Intravenous PRN Tung Jones MD   10 mL at 11/07/18 1609   • sodium chloride  0.9 % flush 3 mL  3 mL Intravenous Q12H Rishi Interiano PA   3 mL at 11/08/18 1020   • sodium chloride 0.9 % flush 3-10 mL  3-10 mL Intravenous PRN Rishi Interiano PA       • vancomycin 1750 mg/500 mL 0.9% NS IVPB ([facility])  1,750 mg Intravenous Q12H Levy Chung MD   1,750 mg at 11/08/18 0412           Results Review:  I have reviewed the labs, radiology results, and diagnostic studies.    Laboratory Data:     Results from last 7 days  Lab Units 11/08/18  0701 11/07/18  1223 11/06/18  0217   SODIUM mmol/L 135* 135* 136*   POTASSIUM mmol/L 4.3 3.6 3.3*   CHLORIDE mmol/L 104 102 102   CO2 mmol/L 25.0 27.0 25.0   BUN mg/dL 9 12 14   CREATININE mg/dL 0.90 0.96 0.86   GLUCOSE mg/dL 93 95 107*   CALCIUM mg/dL 8.6 8.8 8.7   BILIRUBIN mg/dL 1.0 1.0 1.1   ALK PHOS U/L 154* 147* 132*   ALT (SGPT) U/L 120* 130* 139*   AST (SGOT) U/L 60* 61* 82*   ANION GAP mmol/L 6.0 6.0 9.0     Estimated Creatinine Clearance: 149.9 mL/min (by C-G formula based on SCr of 0.9 mg/dL).            Results from last 7 days  Lab Units 11/08/18  0701 11/07/18  1223 11/06/18  0218   WBC 10*3/mm3 9.48 9.90* 11.76*   HEMOGLOBIN g/dL 13.5* 13.7 13.3*   HEMATOCRIT % 38.3* 38.5* 36.9*   PLATELETS 10*3/mm3 186 180 179           Results from last 7 days  Lab Units 11/08/18  0701 11/07/18  1223   CRP mg/dL 23.50* 29.30*       Culture Data:   Blood Culture   Date Value Ref Range Status   11/06/2018 No growth at 2 days  Preliminary   11/06/2018 No growth at 2 days  Preliminary     No results found for: URINECX  No results found for: RESPCX  Wound Culture   Date Value Ref Range Status   11/07/2018 Scant growth (1+) Staphylococcus aureus, MRSA (A)  Final     Comment:       Methicillin resistant Staphylococcus aureus, Patient may be an isolation risk.   11/06/2018 Heavy growth (4+) Staphylococcus aureus, MRSA (A)  Final     Comment:       Methicillin resistant Staphylococcus aureus, Patient may be an isolation risk.    Multi Drug Resistant Organism    contact  precautions requested       No results found for: STOOLCX  No components found for: BODYFLD      Radiology Data:   Imaging Results (last 24 hours)     Procedure Component Value Units Date/Time    CT Upper Extremity Right With Contrast [[id number]] Collected:  11/07/18 1537     Updated:  11/07/18 1622    Narrative:         EXAMINATION:  Computed Tomography      REGION:    Right upper extremity  INDICATION:   Evaluate abscess, localize to elbow, forearm,,  L03.113 Cellulitis of right upper limb      COMPARISON:  none           TECHNIQUE:         - reconstructions:    axial, coronal, sagittal         - contrast:    oral:  none  ;  intravenous: Isovue 300, 100  mL          This exam was performed according to the departmental  dose-optimization program which includes automated exposure  control, adjustment of the mA and/or kV according to patient size  and/or use of iterative reconstruction technique.             COMMENTS:              A CT examination was performed of the right upper extremity for  evaluation of clinically apparent cellulitis.  There is diffuse subcutaneous soft tissue infiltration  posteriorly at the level of the elbow with a focal fluid  collection measuring approximately 2 cm in greatest dimension  immediately posterior to the olecranon.  There is a small amount of joint fluid noted posteriorly.  There is no evidence of a lytic bone lesion and adjacent osseous  structures.    .       Impression:       CONCLUSION:          1. Focal fluid collection immediately posterior to the olecranon  presumably representing an abscess, in direct communication with  the subcutaneous compartment posteriorly likely representing  extensive cellulitis.  2. No evidence of a lytic bone lesion to suspect the presence of  osteomyelitis.  3. Focal joint fluid noted posteriorly, suspicious for the  presence of septic arthritis.    Contrast-enhanced MRI recommended as test of choice for further  evaluation for assessment of  marrow and joint space.                                          Electronically signed by:  YADI Rondon MD  11/7/2018 4:21  PM CST Workstation: [id number]    US Venous Doppler Upper Extremity Right (duplex) [[id number]] Collected:  11/07/18 1445     Updated:  11/07/18 1612    Narrative:       .  EXAMINATION:  Ultrasound, venous, upper right    CLINICAL INDICATION / HISTORY:  Right arm swelling   COMPARISON:  none  TECHNIQUE:  upper extremity, right                         serial axial graded compression technique                         utilizing grayscale, color and Doppler     FINDINGS:    Imaged vessels:    - jugular vein            - subclavian vein       - axillary vein            - brachial vein           - basilic vein             - cephalic vein          - radial vein              - ulnar vein               Unless otherwise indicated, all of the above described vessels  were without evidence of an intraluminal defect and demonstrated  spontaneous and phasic flow as well as appropriate flow with  augmentation.         .      Impression:       CONCLUSION:     1. Negative examination - no evidence of  venous thrombosis of  the right upper extremity.               Electronically signed by:  YADI Rondon MD  11/7/2018 4:11  PM CST Workstation: [id number]    XR Chest Post CVA Port [[id number]] Collected:  11/07/18 1525     Updated:  11/07/18 1544    Narrative:       Chest single view.          CLINICAL INDICATION: Cellulitis. IV access.    COMPARISON: None    FINDINGS: Cardiac silhouette is normal in size. Pulmonary  vascularity is unremarkable.     No focal infiltrate or consolidation.  No pleural effusion.  No  pneumothorax.      Impression:       CONCLUSION: Left arm PICC line catheter with catheter tip in  superior vena cava in satisfactory position.    Electronically signed by:  Trung Chung MD  11/7/2018 3:43 PM CST  Workstation: [id number]    IR PICC wo fluoro guidance [[id number]] Resulted:  11/07/18 1542      Updated:  11/07/18 1542    Narrative:       This procedure was auto-finalized with no dictation required.    US Guided Vascular Access [[id number]] Resulted:  11/07/18 1538     Updated:  11/07/18 1538    Narrative:       This procedure was auto-finalized with no dictation required.    XR Humerus Right [[id number]] Collected:  11/07/18 1301     Updated:  11/07/18 1330    Narrative:         EXAM:  Radiograph(s), Osseous         REGION:   Humerus    SIDE:     Right     VIEWS:   2             INDICATION:    cellulitis     COMPARISON:    none              FINDINGS:           No evidence of a fracture or bony malalignment.     No evidence of osteolytic/osteoblastic disease.       .        Impression:       CONCLUSION:           1. No radiographic evidence of acute osseous pathology.              Note:  if pain or symptoms persist beyond reasonable expectations  and follow-up imaging is anticipated,  cross sectional imaging  (CT and/or MRI) is suggested, as is deemed clinically  appropriate.                      Electronically signed by:  YADI Rondon MD  11/7/2018 1:29  PM CST Workstation: [id number]    XR Forearm 2 View Right [[id number]] Collected:  11/07/18 1301     Updated:  11/07/18 1325    Narrative:         EXAM:  Radiograph(s), Osseous         REGION:   Forearm    SIDE:     Right     VIEWS:   2             INDICATION:    cellulitis     COMPARISON:    none              FINDINGS:           No evidence of a fracture or bony malalignment.     No evidence of osteolytic/osteoblastic disease.     Soft tissue irregularity noted posteriorly/proximally.                            .        Impression:       CONCLUSION:           1. No radiographic evidence of acute osseous pathology.              Note:  if pain or symptoms persist beyond reasonable expectations  and follow-up imaging is anticipated,  cross sectional imaging  (contrast-enhanced MRI) is suggested, as is deemed clinically  appropriate.                       Electronically signed by:  YADI Rondon MD  11/7/2018 1:24  PM CST Workstation: [id number]          I have reviewed the patient current medications.     Assessment/Plan     Active Hospital Problems    Diagnosis Date Noted   • **Cellulitis of right upper extremity [L03.113] 11/07/2018     1.  Cellulitis of right upper extremity: Patient is positive for MRSA, he has been started on vancomycin.  Dr. Naylor is on board will continue following with further follow-up recommendations.  His erythema is improving.  2. Acute cystitis: change cefepime to rocephin.       Discharge Planning: I expect patient to be discharged to home in 1-2 days.      DVT Prophylaxis: SCD              This document has been electronically signed by Jermaine Reyes MD on November 8, 2018 12:21 PM

## 2018-11-08 NOTE — CONSULTS
Inpatient Orthopedic Surgery Consult  Consult performed by: FILI GOMEZ  Consult ordered by: PORTILLO ESCOBAR  Reason for consult: cellulitis right arm          Patient Care Team:  Michelle Chambers MD as PCP - General (Family Medicine)    Chief complaint: Right elbow and arm pain    Subjective     History of Present Illness     The patient is a 33-year-old white male with no specific report of injury that began having pain and swelling in his right arm a few days ago.  He was seen in the emergency department a couple of days ago at which time he had a small incision and drainage and had the area packed with iodoform gauze.  He then had worsening pain and cellulitis of his arm and returned today.  He was admitted and placed on IV antibiotics.  Consult was obtained to evaluate for cellulitis, treatment, and assessment of possible septic arthritis of the right elbow.  He states the pain has been severe at times.  He denies numbness or tingling.    Review of Systems   Constitutional: Positive for chills. Negative for fever.   Respiratory: Negative.    Cardiovascular: Negative.    Gastrointestinal: Negative.         Past Medical History:   Diagnosis Date   • Cellulitis 11/05/2018   , History reviewed. No pertinent surgical history., History reviewed. No pertinent family history.,   Social History   Substance Use Topics   • Smoking status: Current Every Day Smoker     Packs/day: 0.50     Years: 10.00   • Smokeless tobacco: Not on file   • Alcohol use No   ,   Prescriptions Prior to Admission   Medication Sig Dispense Refill Last Dose   • HYDROcodone-acetaminophen (NORCO)  MG per tablet Take 1 tablet by mouth Every 6 (Six) Hours As Needed for Moderate Pain .   11/7/2018 at 1100   • venlafaxine (EFFEXOR) 75 MG tablet Take 75 mg by mouth Daily.   11/6/2018    and Allergies:  Patient has no known allergies.    Objective      Vital Signs  Temp:  [99.2 °F (37.3 °C)-99.3 °F (37.4 °C)] 99.3 °F (37.4 °C)  Heart  Rate:  [71-84] 71  Resp:  [20-24] 20  BP: (106-118)/(64-75) 118/75    Physical Exam   Constitutional: He is oriented to person, place, and time. He appears well-developed and well-nourished. No distress.   Eyes: Pupils are equal, round, and reactive to light. Conjunctivae are normal.   Neck: Neck supple. No tracheal deviation present. No thyromegaly present.   Cardiovascular: Normal rate and intact distal pulses.    Pulmonary/Chest: Effort normal. He exhibits no tenderness.   Abdominal: Soft. He exhibits no distension and no mass. There is no tenderness.   Musculoskeletal:   Left upper extremity is within normal limits with good distal pulses and sensation, full range of motion, nontender on exam, and good muscle tone and strength.    Right upper extremity shows significant cellulitis from mid forearm on the lateral and posterior aspect of the elbow.  Cellulitis does extend along the medial aspect of the upper arm up to the axilla as well as on the lateral aspect of the arm to just below the shoulder.  He is able to extend his arm to about -10° and flex to 130°.  He does have nearly full pronation and supination and has good fist formation as well.   Neurological: He is alert and oriented to person, place, and time.   Skin: Skin is warm. No rash noted.   Psychiatric: He has a normal mood and affect. His behavior is normal. Judgment and thought content normal.       Results Review:      Xr Humerus Right    Result Date: 11/7/2018  Narrative: EXAM:  Radiograph(s), Osseous       REGION:   Humerus  SIDE:     Right   VIEWS:   2         INDICATION:    cellulitis   COMPARISON:    none          FINDINGS:       No evidence of a fracture or bony malalignment.   No evidence of osteolytic/osteoblastic disease.   .        Impression: CONCLUSION:       1. No radiographic evidence of acute osseous pathology.          Note:  if pain or symptoms persist beyond reasonable expectations and follow-up imaging is anticipated,  cross  sectional imaging (CT and/or MRI) is suggested, as is deemed clinically appropriate.            Electronically signed by:  YADI Rondon MD  11/7/2018 1:29 PM CST Workstation: [id number]    Xr Forearm 2 View Right    Result Date: 11/7/2018  Narrative: EXAM:  Radiograph(s), Osseous       REGION:   Forearm  SIDE:     Right   VIEWS:   2         INDICATION:    cellulitis   COMPARISON:    none          FINDINGS:       No evidence of a fracture or bony malalignment.   No evidence of osteolytic/osteoblastic disease.   Soft tissue irregularity noted posteriorly/proximally.                         .        Impression: CONCLUSION:       1. No radiographic evidence of acute osseous pathology.          Note:  if pain or symptoms persist beyond reasonable expectations and follow-up imaging is anticipated,  cross sectional imaging (contrast-enhanced MRI) is suggested, as is deemed clinically appropriate.            Electronically signed by:  YADI Rondon MD  11/7/2018 1:24 PM CST Workstation: [id number]    Us Guided Vascular Access    Result Date: 11/7/2018  Narrative: This procedure was auto-finalized with no dictation required.    Us Venous Doppler Upper Extremity Right (duplex)    Result Date: 11/7/2018  Narrative: . EXAMINATION:  Ultrasound, venous, upper right CLINICAL INDICATION / HISTORY:  Right arm swelling COMPARISON:  none TECHNIQUE:  upper extremity, right                        serial axial graded compression technique                        utilizing grayscale, color and Doppler FINDINGS: Imaged vessels:   - jugular vein          - subclavian vein     - axillary vein          - brachial vein         - basilic vein           - cephalic vein        - radial vein            - ulnar vein           Unless otherwise indicated, all of the above described vessels were without evidence of an intraluminal defect and demonstrated spontaneous and phasic flow as well as appropriate flow with augmentation.      .       Impression: CONCLUSION:   1. Negative examination - no evidence of  venous thrombosis of the right upper extremity.     Electronically signed by:  YADI Rondon MD  11/7/2018 4:11 PM CST Workstation: [id number]    Ct Upper Extremity Right With Contrast    Result Date: 11/7/2018  Narrative: EXAMINATION:  Computed Tomography    REGION:    Right upper extremity INDICATION:   Evaluate abscess, localize to elbow, forearm,, L03.113 Cellulitis of right upper limb    COMPARISON:  none         TECHNIQUE:       - reconstructions:    axial, coronal, sagittal       - contrast:    oral:  none  ;  intravenous: Isovue 300, 100 mL        This exam was performed according to the departmental dose-optimization program which includes automated exposure control, adjustment of the mA and/or kV according to patient size and/or use of iterative reconstruction technique.         COMMENTS:          A CT examination was performed of the right upper extremity for evaluation of clinically apparent cellulitis. There is diffuse subcutaneous soft tissue infiltration posteriorly at the level of the elbow with a focal fluid collection measuring approximately 2 cm in greatest dimension immediately posterior to the olecranon. There is a small amount of joint fluid noted posteriorly. There is no evidence of a lytic bone lesion and adjacent osseous structures. .       Impression: CONCLUSION:      1. Focal fluid collection immediately posterior to the olecranon presumably representing an abscess, in direct communication with the subcutaneous compartment posteriorly likely representing extensive cellulitis. 2. No evidence of a lytic bone lesion to suspect the presence of osteomyelitis. 3. Focal joint fluid noted posteriorly, suspicious for the presence of septic arthritis. Contrast-enhanced MRI recommended as test of choice for further evaluation for assessment of marrow and joint space.                        Electronically signed by:  YADI Kaplan  Alcon KNOTT  11/7/2018 4:21 PM CST Workstation: [id number]    Xr Chest Post Cva Port    Result Date: 11/7/2018  Narrative: Chest single view. CLINICAL INDICATION: Cellulitis. IV access. COMPARISON: None FINDINGS: Cardiac silhouette is normal in size. Pulmonary vascularity is unremarkable. No focal infiltrate or consolidation.  No pleural effusion.  No pneumothorax.     Impression: CONCLUSION: Left arm PICC line catheter with catheter tip in superior vena cava in satisfactory position. Electronically signed by:  Trung Chung MD  11/7/2018 3:43 PM CST Workstation: [id number]    Ir Picc Wo Fluoro Guidance    Result Date: 11/7/2018  Narrative: This procedure was auto-finalized with no dictation required.         I reviewed the patient's new clinical results.  I reviewed the patient's new imaging results and agree with the interpretation.        Assessment/Plan       Cellulitis of right upper extremity      Assessment & Plan     Long discussion was had with the patient and his family regarding further treatment options.  He had a large segment of iodoform gauze that was stuffed into a wound on the posterior lateral aspect of the proximal forearm.  This gauze was removed leaving and open wound into the soft tissue there.  I am not suspicious for septic arthritis at this time as the patient has significant ability to flex and extend as well as pronate and supinate.  I recommend adding Zosyn to his IV antibiotics and continue with observation and elevation of the arm.  Recheck in the morning to assess for improvement.  We will also assess for away in which to do Epson salt soaks on this wound.  He will also have wet-to-dry dressings on the open wound 3 times a day.  Continue to monitor the change in the CRP value as treatment ensues.    The patient is also on Cefipime and therefore zosyn will not be added.    I discussed the patients findings and my recommendations with patient, family, nursing staff and consulting provider    Irvin  Johana Naylor MD  11/07/18  6:27 PM

## 2018-11-09 LAB
ALBUMIN SERPL-MCNC: 3.5 G/DL (ref 3.4–4.8)
ALBUMIN/GLOB SERPL: 1.2 G/DL (ref 1.1–1.8)
ALP SERPL-CCNC: 155 U/L (ref 38–126)
ALT SERPL W P-5'-P-CCNC: 127 U/L (ref 21–72)
ANION GAP SERPL CALCULATED.3IONS-SCNC: 8 MMOL/L (ref 5–15)
AST SERPL-CCNC: 76 U/L (ref 17–59)
BASOPHILS # BLD AUTO: 0.04 10*3/MM3 (ref 0–0.2)
BASOPHILS NFR BLD AUTO: 0.6 % (ref 0–2)
BILIRUB SERPL-MCNC: 0.4 MG/DL (ref 0.2–1.3)
BUN BLD-MCNC: 10 MG/DL (ref 7–21)
BUN/CREAT SERPL: 14.3 (ref 7–25)
CALCIUM SPEC-SCNC: 8.8 MG/DL (ref 8.4–10.2)
CHLORIDE SERPL-SCNC: 107 MMOL/L (ref 95–110)
CO2 SERPL-SCNC: 24 MMOL/L (ref 22–31)
CREAT BLD-MCNC: 0.7 MG/DL (ref 0.7–1.3)
DEPRECATED RDW RBC AUTO: 41.2 FL (ref 35.1–43.9)
EOSINOPHIL # BLD AUTO: 0.34 10*3/MM3 (ref 0–0.7)
EOSINOPHIL NFR BLD AUTO: 4.8 % (ref 0–7)
ERYTHROCYTE [DISTWIDTH] IN BLOOD BY AUTOMATED COUNT: 12.8 % (ref 11.5–14.5)
GFR SERPL CREATININE-BSD FRML MDRD: 130 ML/MIN/1.73 (ref 70–162)
GLOBULIN UR ELPH-MCNC: 3 GM/DL (ref 2.3–3.5)
GLUCOSE BLD-MCNC: 125 MG/DL (ref 60–100)
HAV IGM SERPL QL IA: NEGATIVE
HBV CORE IGM SERPL QL IA: NEGATIVE
HBV SURFACE AG SERPL QL IA: NEGATIVE
HCT VFR BLD AUTO: 36.6 % (ref 39–49)
HCV AB SER DONR QL: NEGATIVE
HGB BLD-MCNC: 12.8 G/DL (ref 13.7–17.3)
IMM GRANULOCYTES # BLD: 0.05 10*3/MM3 (ref 0–0.02)
IMM GRANULOCYTES NFR BLD: 0.7 % (ref 0–0.5)
LYMPHOCYTES # BLD AUTO: 1.36 10*3/MM3 (ref 0.6–4.2)
LYMPHOCYTES NFR BLD AUTO: 19.3 % (ref 10–50)
MCH RBC QN AUTO: 30.3 PG (ref 26.5–34)
MCHC RBC AUTO-ENTMCNC: 35 G/DL (ref 31.5–36.3)
MCV RBC AUTO: 86.5 FL (ref 80–98)
MONOCYTES # BLD AUTO: 0.59 10*3/MM3 (ref 0–0.9)
MONOCYTES NFR BLD AUTO: 8.4 % (ref 0–12)
NEUTROPHILS # BLD AUTO: 4.68 10*3/MM3 (ref 2–8.6)
NEUTROPHILS NFR BLD AUTO: 66.2 % (ref 37–80)
PLATELET # BLD AUTO: 226 10*3/MM3 (ref 150–450)
PMV BLD AUTO: 10.7 FL (ref 8–12)
POTASSIUM BLD-SCNC: 3.7 MMOL/L (ref 3.5–5.1)
PROT SERPL-MCNC: 6.5 G/DL (ref 6.3–8.6)
RBC # BLD AUTO: 4.23 10*6/MM3 (ref 4.37–5.74)
SODIUM BLD-SCNC: 139 MMOL/L (ref 137–145)
VANCOMYCIN TROUGH SERPL-MCNC: 8.53 MCG/ML (ref 10–15)
WBC NRBC COR # BLD: 7.06 10*3/MM3 (ref 3.2–9.8)

## 2018-11-09 PROCEDURE — 80202 ASSAY OF VANCOMYCIN: CPT | Performed by: FAMILY MEDICINE

## 2018-11-09 PROCEDURE — 85025 COMPLETE CBC W/AUTO DIFF WBC: CPT | Performed by: FAMILY MEDICINE

## 2018-11-09 PROCEDURE — 25010000002 VANCOMYCIN 5 G RECONSTITUTED SOLUTION: Performed by: FAMILY MEDICINE

## 2018-11-09 PROCEDURE — 94799 UNLISTED PULMONARY SVC/PX: CPT

## 2018-11-09 PROCEDURE — 25010000002 VANCOMYCIN 5 G RECONSTITUTED SOLUTION: Performed by: PHYSICIAN ASSISTANT

## 2018-11-09 PROCEDURE — 80053 COMPREHEN METABOLIC PANEL: CPT | Performed by: FAMILY MEDICINE

## 2018-11-09 PROCEDURE — 94760 N-INVAS EAR/PLS OXIMETRY 1: CPT

## 2018-11-09 RX ADMIN — VANCOMYCIN HYDROCHLORIDE 2000 MG: 1 INJECTION, POWDER, LYOPHILIZED, FOR SOLUTION INTRAVENOUS at 17:04

## 2018-11-09 RX ADMIN — VANCOMYCIN HYDROCHLORIDE 1750 MG: 1 INJECTION, POWDER, LYOPHILIZED, FOR SOLUTION INTRAVENOUS at 04:01

## 2018-11-09 RX ADMIN — MAGNESIUM SULFATE 20 G: 1 CRYSTAL ORAL; TOPICAL at 21:40

## 2018-11-09 RX ADMIN — MAGNESIUM SULFATE 20 G: 1 CRYSTAL ORAL; TOPICAL at 13:06

## 2018-11-09 RX ADMIN — Medication 3 ML: at 13:07

## 2018-11-09 RX ADMIN — Medication 3 ML: at 21:40

## 2018-11-09 NOTE — PROGRESS NOTES
"Pharmacokinetics by Pharmacy - Vancomycin    David Mendoza is a 33 y.o. male receiving vancomycin 1750mg IV Q12H day 3 for skin and soft tissue infection  Patient is also receiving cefepime    Objective:  [Ht: 195.6 cm (77\"); Wt: 90.8 kg (200 lb 2 oz)]     Lab Results   Component Value Date    WBC 7.06 11/09/2018    WBC 9.48 11/08/2018    WBC 9.90 (H) 11/07/2018      Lab Results   Component Value Date    CRP 23.50 (H) 11/08/2018    CRP 29.30 (H) 11/07/2018    LACTATE 0.7 11/06/2018      Temp Readings from Last 1 Encounters:   11/09/18 96.5 °F (35.8 °C) (Oral)     Estimated Creatinine Clearance: 192.8 mL/min (by C-G formula based on SCr of 0.7 mg/dL).   Lab Results   Component Value Date    CREATININE 0.70 11/09/2018    CREATININE 0.90 11/08/2018    CREATININE 0.96 11/07/2018       Lab Results   Component Value Date    VANCOTROUGH 8.53 (L) 11/09/2018       Culture Results:  Microbiology Results (last 10 days)       Procedure Component Value - Date/Time    Wound Culture - Wound, Elbow, Right [[id number]]  (Abnormal) Collected:  11/07/18 1624    Lab Status:  Final result Specimen:  Wound from Elbow, Right Updated:  11/08/18 0736     Wound Culture Scant growth (1+) Staphylococcus aureus, MRSA (A)     Comment:   Methicillin resistant Staphylococcus aureus, Patient may be an isolation risk.        Gram Stain Result Rare (1+) WBCs seen      No organisms seen    Narrative:       Slide reviewed confirmed  Multi Drug Resistant Organism  contact precautions requested      for sensitivity see previous report      Wound Culture - Wound, Arm, Right [[id number]]  (Abnormal)  (Susceptibility) Collected:  11/06/18 0415    Lab Status:  Final result Specimen:  Wound from Arm, Right Updated:  11/08/18 0612     Wound Culture Heavy growth (4+) Staphylococcus aureus, MRSA (A)     Comment:   Methicillin resistant Staphylococcus aureus, Patient may be an isolation risk.    Multi Drug Resistant Organism    contact precautions requested   "        Gram Stain Result Many (4+) WBCs seen      Moderate (3+) Gram positive cocci    Susceptibility        Staphylococcus aureus, MRSA     YO     Clindamycin <=0.5 ug/ml Susceptible     Gentamicin <=4 ug/ml Susceptible     Oxacillin >2 ug/ml Resistant     Tetracycline <=4 ug/ml Susceptible     Trimethoprim + Sulfamethoxazole >2/38 ug/ml Resistant     Vancomycin 1 ug/ml Susceptible                      Blood Culture - Blood, [[id number]]  (Normal) Collected:  11/06/18 0250    Lab Status:  Preliminary result Specimen:  Blood from Arm, Left Updated:  11/09/18 0300     Blood Culture No growth at 3 days    Blood Culture - Blood, [[id number]]  (Normal) Collected:  11/06/18 0217    Lab Status:  Preliminary result Specimen:  Blood from Arm, Left Updated:  11/09/18 0230     Blood Culture No growth at 3 days                 Assessment:  WBC WNL  SCr WNL  Afebrile  CRP 24 yesterday    Labs in progress: wound - MRSA; BCx2 11/6 NG3d    Trough 11/9 0303 is 8.53 drawn 30 minutes early, estimated 8.2, below goal of 10-15.  Will increase dose to vancomycin 2000 mg IV Q12H.  Will monitor for accumulation.  Will monitor for RIP on increased dose and cefepime.  Possible deescalation to vancomycin monotherapy, no gram negatives or pseudomonas growing.    Utilizing traditional dosing   Goal trough for SSTI: 10-15    Plan:  1. Change to vancomycin 2000mg IV Q12H  2. Trough before fourth dose on new regimen, 11/11 0330  3. Pharmacy will monitor renal function and adjust dose accordingly.      Cj Medel, [facility]   11/09/18 12:59 PM

## 2018-11-09 NOTE — PROGRESS NOTES
[facility] Medicine Services  INPATIENT PROGRESS NOTE    Length of Stay: 2  Date of Admission: 11/7/2018  Primary Care Physician: Michelle Chambers MD    Subjective     Chief Complaint   Patient presents with   • Wound Infection     HPI: 33-year-old male who was admitted for right arm swelling.  Patient is status post I&D which was performed on 11/6/18 in the ER.  However he stated that his swelling and cellulitis has gotten worse.  Patient was presented back to the ER and admitted for IV antibiotics.    11/8/18: Patient has been started on vancomycin, he is positive for MRSA.  Dr. Naylor has seen and evaluated the patient continue with current therapy.    11/9/18: Pt is doing well today, still has erythema of the right arm. Mildly elevate LFTs.     Review of Systems   Constitutional: Positive for activity change, appetite change, fatigue and fever.   HENT: Negative for congestion and rhinorrhea.    Respiratory: Negative for chest tightness, shortness of breath and wheezing.    Cardiovascular: Negative for chest pain, palpitations and leg swelling.   Gastrointestinal: Negative for abdominal pain, blood in stool, diarrhea, nausea and vomiting.   Genitourinary: Negative for dysuria, frequency and urgency.   Musculoskeletal: Positive for joint swelling. Negative for back pain.   Skin: Positive for color change, rash and wound.   Neurological: Negative for dizziness, syncope, light-headedness and headaches.   Hematological: Negative for adenopathy. Does not bruise/bleed easily.   Psychiatric/Behavioral: Negative for agitation, behavioral problems and confusion.        All pertinent negatives and positives are as above. All other systems have been reviewed and are negative unless otherwise stated.     Objective      Vital Sign Min/Max for last 24 hours  Temp  Min: 96.2 °F (35.7 °C)  Max: 98.9 °F (37.2 °C)   BP  Min: 94/52  Max: 118/75   Pulse  Min: 63  Max: 80   Resp  Min: 18   Max: 18   SpO2  Min: 95 %  Max: 100 %   No Data Recorded   No Data Recorded         Physical Exam   Constitutional: He is oriented to person, place, and time. He appears well-developed and well-nourished.   HENT:   Head: Normocephalic.   Eyes: Pupils are equal, round, and reactive to light. EOM are normal.   Neck: Normal range of motion.   Cardiovascular: Normal rate, regular rhythm and normal heart sounds.    Pulmonary/Chest: Effort normal and breath sounds normal.   Abdominal: Soft. Bowel sounds are normal.   Musculoskeletal: Normal range of motion.   Neurological: He is alert and oriented to person, place, and time.   Skin: Abrasion, lesion and rash noted. There is erythema.        Psychiatric: He has a normal mood and affect. His behavior is normal.   Vitals reviewed.      Current Facility-Administered Medications   Medication Dose Route Frequency Provider Last Rate Last Dose   • acetaminophen (TYLENOL) tablet 650 mg  650 mg Oral Q4H PRN Rishi Interiano PA       • HYDROcodone-acetaminophen (NORCO)  MG per tablet 1 tablet  1 tablet Oral Q6H PRN Levy Chung MD       • iopamidol (ISOVUE-300) 61 % injection 100 mL  100 mL Intravenous Once in imaging Irvin Naylor MD       • ipratropium-albuterol (DUO-NEB) nebulizer solution 3 mL  3 mL Nebulization Q4H PRN Levy Chung MD       • ketorolac (TORADOL) injection 30 mg  30 mg Intravenous Q6H PRN Rishi Interiano PA       • magnesium sulfate (EPSOM SALT) granules 20 g  20 g Topical TID Irvin Naylor MD   20 g at 11/08/18 2044   • morphine injection 2 mg  2 mg Intravenous Q3H PRN Levy Chung MD       • ondansetron (ZOFRAN) injection 4 mg  4 mg Intravenous Q6H PRN Levy Chung MD       • Pharmacy to dose vancomycin   Does not apply Continuous PRN Levy Chung MD       • sodium chloride 0.9 % flush 10 mL  10 mL Intravenous PRN Tung Jones MD   10 mL at 11/07/18 1609   • sodium chloride 0.9 % flush 3 mL  3 mL Intravenous Q12H  Rishi Interiano PA   3 mL at 11/08/18 2044   • sodium chloride 0.9 % flush 3-10 mL  3-10 mL Intravenous PRN Rishi Interiano PA       • vancomycin 1750 mg/500 mL 0.9% NS IVPB ([facility])  1,750 mg Intravenous Q12H Levy Chung MD   1,750 mg at 11/09/18 0401           Results Review:  I have reviewed the labs, radiology results, and diagnostic studies.    Laboratory Data:     Results from last 7 days  Lab Units 11/09/18  1038 11/08/18  0701 11/07/18  1223   SODIUM mmol/L 139 135* 135*   POTASSIUM mmol/L 3.7 4.3 3.6   CHLORIDE mmol/L 107 104 102   CO2 mmol/L 24.0 25.0 27.0   BUN mg/dL 10 9 12   CREATININE mg/dL 0.70 0.90 0.96   GLUCOSE mg/dL 125* 93 95   CALCIUM mg/dL 8.8 8.6 8.8   BILIRUBIN mg/dL 0.4 1.0 1.0   ALK PHOS U/L 155* 154* 147*   ALT (SGPT) U/L 127* 120* 130*   AST (SGOT) U/L 76* 60* 61*   ANION GAP mmol/L 8.0 6.0 6.0     Estimated Creatinine Clearance: 192.8 mL/min (by C-G formula based on SCr of 0.7 mg/dL).            Results from last 7 days  Lab Units 11/09/18  1024 11/08/18  0701 11/07/18  1223 11/06/18  0218   WBC 10*3/mm3 7.06 9.48 9.90* 11.76*   HEMOGLOBIN g/dL 12.8* 13.5* 13.7 13.3*   HEMATOCRIT % 36.6* 38.3* 38.5* 36.9*   PLATELETS 10*3/mm3 226 186 180 179           Results from last 7 days  Lab Units 11/08/18  0701 11/07/18  1223   CRP mg/dL 23.50* 29.30*       Culture Data:   No results found for: BLOODCX  No results found for: URINECX  No results found for: RESPCX  Wound Culture   Date Value Ref Range Status   11/07/2018 Scant growth (1+) Staphylococcus aureus, MRSA (A)  Final     Comment:       Methicillin resistant Staphylococcus aureus, Patient may be an isolation risk.     No results found for: STOOLCX  No components found for: BODYFLD      Radiology Data:   Imaging Results (last 24 hours)     ** No results found for the last 24 hours. **          I have reviewed the patient current medications.     Assessment/Plan     Active Hospital Problems    Diagnosis Date Noted   • **Cellulitis of  right upper extremity [L03.113] 11/07/2018     1.  Cellulitis of right upper extremity: Patient is positive for MRSA, he has been started on vancomycin.  Dr. Naylor is on board will continue following with further follow-up recommendations.  His erythema is improving.  2. Abnormal urine: pt is asymptomatic.  3. Elevate LFTs: will get hepatitis panel and US.         Discharge Planning: I expect patient to be discharged to home in 1-2 days.      DVT Prophylaxis: SCD              This document has been electronically signed by Jermaine Reyes MD on November 9, 2018 12:25 PM

## 2018-11-09 NOTE — PLAN OF CARE
Problem: Patient Care Overview  Goal: Plan of Care Review   11/07/18 [id number] 11/08/18 2045   Coping/Psychosocial   Plan of Care Reviewed With --  patient;significant other   Plan of Care Review   Progress no change --    OTHER   Outcome Summary Patient resting in bed. VS stable at beginning of shift. Right arm soaked in epsom salt per Dr. Naylor. orders. Patient's arm rewrapped and packed after per Dr. Naylor orders. Will continue to monitor this patient.  --

## 2018-11-09 NOTE — PLAN OF CARE
Problem: Skin and Soft Tissue Infection (Adult)  Goal: Signs and Symptoms of Listed Potential Problems Will be Absent, Minimized or Managed (Skin and Soft Tissue Infection)  Outcome: Ongoing (interventions implemented as appropriate)      Problem: Pain, Acute (Adult)  Goal: Acceptable Pain Control/Comfort Level  Outcome: Ongoing (interventions implemented as appropriate)      Problem: Patient Care Overview  Goal: Plan of Care Review  Outcome: Ongoing (interventions implemented as appropriate)    Goal: Discharge Needs Assessment  Outcome: Ongoing (interventions implemented as appropriate)

## 2018-11-10 ENCOUNTER — APPOINTMENT (OUTPATIENT)
Dept: ULTRASOUND IMAGING | Facility: HOSPITAL | Age: 33
End: 2018-11-10

## 2018-11-10 VITALS
HEIGHT: 77 IN | RESPIRATION RATE: 18 BRPM | BODY MASS INDEX: 23.52 KG/M2 | DIASTOLIC BLOOD PRESSURE: 71 MMHG | TEMPERATURE: 96.6 F | OXYGEN SATURATION: 99 % | SYSTOLIC BLOOD PRESSURE: 116 MMHG | WEIGHT: 199.2 LBS | HEART RATE: 65 BPM

## 2018-11-10 LAB — CRP SERPL-MCNC: 5 MG/DL (ref 0–1)

## 2018-11-10 PROCEDURE — 99231 SBSQ HOSP IP/OBS SF/LOW 25: CPT | Performed by: ORTHOPAEDIC SURGERY

## 2018-11-10 PROCEDURE — 86140 C-REACTIVE PROTEIN: CPT | Performed by: ORTHOPAEDIC SURGERY

## 2018-11-10 PROCEDURE — 25010000002 VANCOMYCIN: Performed by: PHYSICIAN ASSISTANT

## 2018-11-10 PROCEDURE — 76705 ECHO EXAM OF ABDOMEN: CPT

## 2018-11-10 RX ORDER — CLINDAMYCIN HYDROCHLORIDE 150 MG/1
450 CAPSULE ORAL EVERY 6 HOURS SCHEDULED
Qty: 129 CAPSULE | Refills: 0 | Status: SHIPPED | OUTPATIENT
Start: 2018-11-10 | End: 2018-11-21

## 2018-11-10 RX ORDER — CLINDAMYCIN HYDROCHLORIDE 150 MG/1
450 CAPSULE ORAL EVERY 6 HOURS SCHEDULED
Status: DISCONTINUED | OUTPATIENT
Start: 2018-11-10 | End: 2018-11-10 | Stop reason: HOSPADM

## 2018-11-10 RX ADMIN — VANCOMYCIN HYDROCHLORIDE 2000 MG: 1 INJECTION, POWDER, LYOPHILIZED, FOR SOLUTION INTRAVENOUS at 04:44

## 2018-11-10 RX ADMIN — Medication 3 ML: at 10:05

## 2018-11-10 RX ADMIN — MAGNESIUM SULFATE 20 G: 1 CRYSTAL ORAL; TOPICAL at 10:05

## 2018-11-10 RX ADMIN — CLINDAMYCIN HYDROCHLORIDE 450 MG: 150 CAPSULE ORAL at 11:51

## 2018-11-10 NOTE — DISCHARGE SUMMARY
[address] [address]  T - [phone]     DISCHARGE SUMMARY         PATIENT  DEMOGRAPHICS   PATIENT NAME: David Mendoza                      PHYSICIAN: Jermaine Reyes MD  : 1985  MRN: [id number]    ADMISSION/DISCHARGE INFO   ADMISSION DATE: 2018   DISCHARGE DATE: 11/10/18    ADMISSION DIAGNOSES: Cellulitis of right upper extremity [L03.113]  DISCHARGE DIAGNOSES:       Cellulitis of right upper extremity      SERVICE:  Medicine       CONSULTS   Consult Orders (all) (From admission, onward)    Start     Ordered    18 1355  Inpatient Orthopedic Surgery Consult  Once     Specialty:  Orthopedic Surgery  Provider:  Irvin Naylor MD    18 1355          PROCEDURES     Imaging Results (last 24 hours)     Procedure Component Value Units Date/Time    US Liver [[id number]] Collected:  11/10/18 0910     Updated:  11/10/18 1148    Narrative:       Ultrasound liver, right upper quadrant.    HISTORY: Elevated liver function tests.        FINDINGS: Normal liver. No masses or intrahepatic biliary  dilatation.    Normal gallbladder. No stones or gallbladder wall thickening.  Slightly dilated common bile duct 0.58 cm.    Normal pancreas.    Normal right kidney 12.6 x 4.68 x5.5 Cm.    Normal aorta.      Impression:       CONCLUSION: Normal liver. Slightly dilated common bile duct 0.58  cm. Examination is otherwise unremarkable.    Electronically signed by:  Trung Chung MD  11/10/2018 11:46 AM  CST Workstation: [id number]          Xr Humerus Right    Result Date: 2018  Narrative: EXAM:  Radiograph(s), Osseous       REGION:   Humerus  SIDE:     Right   VIEWS:   2         INDICATION:    cellulitis   COMPARISON:    none          FINDINGS:       No evidence of a fracture or bony malalignment.   No evidence of osteolytic/osteoblastic disease.   .        Impression: CONCLUSION:       1. No radiographic evidence of acute osseous pathology.           Note:  if pain or symptoms persist beyond reasonable expectations and follow-up imaging is anticipated,  cross sectional imaging (CT and/or MRI) is suggested, as is deemed clinically appropriate.            Electronically signed by:  YADI Rondon MD  11/7/2018 1:29 PM CST Workstation: [id number]    Xr Forearm 2 View Right    Result Date: 11/7/2018  Narrative: EXAM:  Radiograph(s), Osseous       REGION:   Forearm  SIDE:     Right   VIEWS:   2         INDICATION:    cellulitis   COMPARISON:    none          FINDINGS:       No evidence of a fracture or bony malalignment.   No evidence of osteolytic/osteoblastic disease.   Soft tissue irregularity noted posteriorly/proximally.                         .        Impression: CONCLUSION:       1. No radiographic evidence of acute osseous pathology.          Note:  if pain or symptoms persist beyond reasonable expectations and follow-up imaging is anticipated,  cross sectional imaging (contrast-enhanced MRI) is suggested, as is deemed clinically appropriate.            Electronically signed by:  YADI Rondon MD  11/7/2018 1:24 PM CST Workstation: [id number]    Us Guided Vascular Access    Result Date: 11/7/2018  Narrative: This procedure was auto-finalized with no dictation required.    Us Liver    Result Date: 11/10/2018  Narrative: Ultrasound liver, right upper quadrant. HISTORY: Elevated liver function tests. FINDINGS: Normal liver. No masses or intrahepatic biliary dilatation. Normal gallbladder. No stones or gallbladder wall thickening. Slightly dilated common bile duct 0.58 cm. Normal pancreas. Normal right kidney 12.6 x 4.68 x5.5 Cm. Normal aorta.     Impression: CONCLUSION: Normal liver. Slightly dilated common bile duct 0.58 cm. Examination is otherwise unremarkable. Electronically signed by:  Trung Chung MD  11/10/2018 11:46 AM CST Workstation: [id number]    Us Venous Doppler Upper Extremity Right (duplex)    Result Date: 11/7/2018  Narrative: . EXAMINATION:   Ultrasound, venous, upper right CLINICAL INDICATION / HISTORY:  Right arm swelling COMPARISON:  none TECHNIQUE:  upper extremity, right                        serial axial graded compression technique                        utilizing grayscale, color and Doppler FINDINGS: Imaged vessels:   - jugular vein          - subclavian vein     - axillary vein          - brachial vein         - basilic vein           - cephalic vein        - radial vein            - ulnar vein           Unless otherwise indicated, all of the above described vessels were without evidence of an intraluminal defect and demonstrated spontaneous and phasic flow as well as appropriate flow with augmentation.      .      Impression: CONCLUSION:   1. Negative examination - no evidence of  venous thrombosis of the right upper extremity.     Electronically signed by:  YADI Rondon MD  11/7/2018 4:11 PM CST Workstation: [id number]    Ct Upper Extremity Right With Contrast    Result Date: 11/7/2018  Narrative: EXAMINATION:  Computed Tomography    REGION:    Right upper extremity INDICATION:   Evaluate abscess, localize to elbow, forearm,, L03.113 Cellulitis of right upper limb    COMPARISON:  none         TECHNIQUE:       - reconstructions:    axial, coronal, sagittal       - contrast:    oral:  none  ;  intravenous: Isovue 300, 100 mL        This exam was performed according to the departmental dose-optimization program which includes automated exposure control, adjustment of the mA and/or kV according to patient size and/or use of iterative reconstruction technique.         COMMENTS:          A CT examination was performed of the right upper extremity for evaluation of clinically apparent cellulitis. There is diffuse subcutaneous soft tissue infiltration posteriorly at the level of the elbow with a focal fluid collection measuring approximately 2 cm in greatest dimension immediately posterior to the olecranon. There is a small amount of joint fluid  noted posteriorly. There is no evidence of a lytic bone lesion and adjacent osseous structures. .       Impression: CONCLUSION:      1. Focal fluid collection immediately posterior to the olecranon presumably representing an abscess, in direct communication with the subcutaneous compartment posteriorly likely representing extensive cellulitis. 2. No evidence of a lytic bone lesion to suspect the presence of osteomyelitis. 3. Focal joint fluid noted posteriorly, suspicious for the presence of septic arthritis. Contrast-enhanced MRI recommended as test of choice for further evaluation for assessment of marrow and joint space.                        Electronically signed by:  YADI Rondon MD  11/7/2018 4:21 PM CST Workstation: [id number]    Xr Chest Post Cva Port    Result Date: 11/7/2018  Narrative: Chest single view. CLINICAL INDICATION: Cellulitis. IV access. COMPARISON: None FINDINGS: Cardiac silhouette is normal in size. Pulmonary vascularity is unremarkable. No focal infiltrate or consolidation.  No pleural effusion.  No pneumothorax.     Impression: CONCLUSION: Left arm PICC line catheter with catheter tip in superior vena cava in satisfactory position. Electronically signed by:  Trung Chung MD  11/7/2018 3:43 PM CST Workstation: [id number]    Ir Picc Wo Fluoro Guidance    Result Date: 11/7/2018  Narrative: This procedure was auto-finalized with no dictation required.      HISTORY OF PRESENT ILLNESS   From H and P     The patient presents one day post-drainage of an abscess of the right upper extremity. He has a five day history of this infection. He first noted that this started with what he thought was a pimple, but over the last five days it has become larger and more inflamed. He presented to the ER on 11-6-18 where an I&D was performed, the wound was packed, and he was given Bactrim. Since that time his arm has become more swollen and cellulitic. He denies IV drug use and admits to working in the  [employer].        DIAGNOSTIC DATA   Labs   Images     HOSPITAL COURSE     Patient was admitted to the medical floor, wound cultures were obtained which showed that patient had MRSA, it was sensitive to vancomycin and clindamycin and doxycycline.  Patient was continued on vancomycin initially.  His symptom improved significantly.  He is transitioned to clindamycin after sensitivity.  His range of motion has improved erythema has improved patient will follow-up with PCP and Dr. aNylor as outpatient basis.  At time of discharge patient is stable, vital signs are stable.  If he has any worseningsymptoms or chest pain or shortness of air patient is to come back to the ER.    Physical Exam   Constitutional: He is oriented to person, place, and time. He appears well-developed and well-nourished.   HENT:   Head: Normocephalic and atraumatic.   Eyes: EOM are normal. Pupils are equal, round, and reactive to light.   Neck: Normal range of motion.   Cardiovascular: Normal rate, regular rhythm and normal heart sounds.   Pulmonary/Chest: Effort normal and breath sounds normal.   Abdominal: Soft. Bowel sounds are normal.   Musculoskeletal: Normal range of motion.   Neurological: He is alert and oriented to person, place, and time.   Skin: Lesion noted. There is erythema (Improving).        Vitals reviewed.         DISCHARGE CONDITION   Stable    DISPOSITION   To Home      DISCHARGE MEDICATIONS        Discharge Medications      New Medications      Instructions Start Date   clindamycin 150 MG capsule  Commonly known as:  CLEOCIN   450 mg, Oral, Every 6 Hours Scheduled         Continue These Medications      Instructions Start Date   HYDROcodone-acetaminophen  MG per tablet  Commonly known as:  NORCO   1 tablet, Oral, Every 6 Hours PRN      venlafaxine 75 MG tablet  Commonly known as:  EFFEXOR   75 mg, Oral, Daily               INSTRUCTIONS   Activity:   Activity Instructions     Discharge Activity      As tolerated          Diet:    Diet Instructions     Diet: Regular      Discharge Diet:  Regular          Special Instructions: Patient instructed to call M.D. or return to ED with worsening shortness of breath, chest pain, fever greater than 100.4°F or any other medical concerns.    FOLLOW UP   Follow-up Information     Ji Marcial MD Follow up in 4 day(s).    Specialty:  Family Medicine  Contact information:  [address] [place] [id number] [phone]             Irvin Naylor MD Follow up in 5 day(s).    Specialty:  Orthopedic Surgery  Contact information:  [address] [place]  [facility] [id number] [phone]                  PENDING TEST RESULTS AT DISCHARGE      TIME   Time: 20 minutes were spent planning this discharge.                      This document has been electronically signed by Jermaine Reyes MD on November 10, 2018 1:28 PM

## 2018-11-10 NOTE — PROGRESS NOTES
ORTHOPEDIC PROGRESS NOTE:    Name:  David Mendoza  Date:    11/10/2018  Date of admission:  11/7/2018      Subjective:  Pain is improved.  Motion is improved as well.  No new complaints.  No fevers or chills.    Vitals:    Vitals:    11/09/18 2330   BP: 132/93   Pulse: 71   Resp: 20   Temp: 96.5 °F (35.8 °C)   SpO2: 98%       Exam:  Awake, alert, and in no apparent distress.  Answers questions appropriately.  Swelling is markedly improved in the right upper extremity.  Erythema in the right upper extremity is markedly improved.  He still has an open wound on the dorsal and lateral aspect of his forearm close to the elbow.  He still has a mild amount of purulent material that is draining but, it is draining.  There is no surrounding erythema.  There is no significant swelling around this wound now.  No olecranon bursa fluid present.  Good elbow motion.  Good supination and pronation.  Good distal pulses and sensation.      ASSESSMENT:  Active Hospital Problems    Diagnosis Date Noted   • **Cellulitis of right upper extremity [L03.113] 11/07/2018         PLAN:  CRP is pending for today.  I expect it to be significantly decreased.  Pending his overall medical stability and the results of the ultrasound it is conceivable for him to go home today or tomorrow on oral antibiotics and continue dressing changes.  He was encouraged to continue with elevation and finger motion.  Follow up with his primary care provider and resultant visit to orthopedics if necessary would be most warranted.    11/10/18 at 9:31 AM by Irivn Naylor MD

## 2018-11-10 NOTE — PLAN OF CARE
Problem: Skin and Soft Tissue Infection (Adult)  Goal: Signs and Symptoms of Listed Potential Problems Will be Absent, Minimized or Managed (Skin and Soft Tissue Infection)  Outcome: Ongoing (interventions implemented as appropriate)   11/10/18 0455   Goal/Outcome Evaluation   Problems Assessed (Skin and Soft Tissue Infection) all   Problems Present (Skin/Soft Tissue Inf) none       Problem: Pain, Acute (Adult)  Goal: Acceptable Pain Control/Comfort Level  Outcome: Ongoing (interventions implemented as appropriate)   11/10/18 0455   Pain, Acute (Adult)   Acceptable Pain Control/Comfort Level achieves outcome       Problem: Patient Care Overview  Goal: Plan of Care Review  Outcome: Ongoing (interventions implemented as appropriate)   11/10/18 0455   Coping/Psychosocial   Plan of Care Reviewed With patient   Plan of Care Review   Progress no change

## 2018-11-11 ENCOUNTER — READMISSION MANAGEMENT (OUTPATIENT)
Dept: CALL CENTER | Facility: HOSPITAL | Age: 33
End: 2018-11-11

## 2018-11-11 LAB
BACTERIA SPEC AEROBE CULT: NORMAL
BACTERIA SPEC AEROBE CULT: NORMAL

## 2018-11-11 NOTE — OUTREACH NOTE
Prep Survey      Responses   Facility patient discharged from?  [facility]   Is patient eligible?  Yes   Discharge diagnosis  Cellulitis of right upper extremity   Does the patient have one of the following disease processes/diagnoses(primary or secondary)?  Other   Does the patient have Home health ordered?  No   Is there a DME ordered?  No   Prep survey completed?  Yes          Radha Spicer RN

## 2018-11-12 ENCOUNTER — READMISSION MANAGEMENT (OUTPATIENT)
Dept: CALL CENTER | Facility: HOSPITAL | Age: 33
End: 2018-11-12

## 2018-11-12 NOTE — OUTREACH NOTE
Medical Week 1 Survey      Responses   Facility patient discharged from?  [facility]   Does the patient have one of the following disease processes/diagnoses(primary or secondary)?  Other   Is there a successful TCM telephone encounter documented?  No   Week 1 attempt successful?  Yes   Call start time  1612   Rescheduled  Rescheduled-pt requested   Discharge diagnosis  Cellulitis of right upper extremity          Shahnaz Le RN

## 2018-11-14 ENCOUNTER — READMISSION MANAGEMENT (OUTPATIENT)
Dept: CALL CENTER | Facility: HOSPITAL | Age: 33
End: 2018-11-14

## 2018-11-14 NOTE — OUTREACH NOTE
Medical Week 1 Survey      Responses   Facility patient discharged from?  [facility]   Does the patient have one of the following disease processes/diagnoses(primary or secondary)?  Other   Is there a successful TCM telephone encounter documented?  No   Week 1 attempt successful?  Yes   Call start time  1611   Rescheduled  Revoked [Pt not answering phone and Mother states he is slepping probably as he works at night]          Citlalli Servin RN